# Patient Record
Sex: FEMALE | URBAN - METROPOLITAN AREA
[De-identification: names, ages, dates, MRNs, and addresses within clinical notes are randomized per-mention and may not be internally consistent; named-entity substitution may affect disease eponyms.]

---

## 2021-02-22 ENCOUNTER — OFFICE VISIT (OUTPATIENT)
Dept: URBAN - METROPOLITAN AREA CLINIC 92 | Facility: CLINIC | Age: 56
End: 2021-02-22

## 2021-10-25 ENCOUNTER — CLAIMS CREATED FROM THE CLAIM WINDOW (OUTPATIENT)
Dept: URBAN - METROPOLITAN AREA SURGERY CENTER 16 | Facility: SURGERY CENTER | Age: 56
End: 2021-10-25

## 2021-10-25 ENCOUNTER — CLAIMS CREATED FROM THE CLAIM WINDOW (OUTPATIENT)
Dept: URBAN - METROPOLITAN AREA CLINIC 4 | Facility: CLINIC | Age: 56
End: 2021-10-25
Payer: COMMERCIAL

## 2021-10-25 ENCOUNTER — CLAIMS CREATED FROM THE CLAIM WINDOW (OUTPATIENT)
Dept: URBAN - METROPOLITAN AREA SURGERY CENTER 16 | Facility: SURGERY CENTER | Age: 56
End: 2021-10-25
Payer: COMMERCIAL

## 2021-10-25 DIAGNOSIS — D12.8 ADENOMATOUS POLYP OF RECTUM: ICD-10-CM

## 2021-10-25 DIAGNOSIS — Z12.11 AVERAGE RISK FOR CRC. DUE TO PT'S CO-MORBID STATE WITH END STAGE DEMENTIA, HIGH RISK FOR ANESTHESIA (PER NEUROLOGY); INABILITY TO TAKE A BOWEL PREP....WOULD NOT ADVISE ANY COLORECTAL CANCER SCREENING INCLUDING STOOL TEST FOR FECAL BLOOD.: ICD-10-CM

## 2021-10-25 DIAGNOSIS — Z80.0 BROTHER AT YOUNG AGE FAMILY HISTORY OF COLON CANCER: ICD-10-CM

## 2021-10-25 DIAGNOSIS — D12.8 BENIGN NEOPLASM OF RECTUM: ICD-10-CM

## 2021-10-25 PROCEDURE — 45385 COLONOSCOPY W/LESION REMOVAL: CPT | Performed by: INTERNAL MEDICINE

## 2021-10-25 PROCEDURE — 88305 TISSUE EXAM BY PATHOLOGIST: CPT | Performed by: PATHOLOGY

## 2021-10-25 PROCEDURE — G8907 PT DOC NO EVENTS ON DISCHARG: HCPCS | Performed by: INTERNAL MEDICINE

## 2022-04-11 ENCOUNTER — OFFICE VISIT (OUTPATIENT)
Dept: URBAN - METROPOLITAN AREA CLINIC 92 | Facility: CLINIC | Age: 57
End: 2022-04-11

## 2023-01-09 ENCOUNTER — OFFICE VISIT (OUTPATIENT)
Dept: FAMILY MEDICINE CLINIC | Facility: CLINIC | Age: 58
End: 2023-01-09
Payer: COMMERCIAL

## 2023-01-09 VITALS
TEMPERATURE: 97.5 F | DIASTOLIC BLOOD PRESSURE: 94 MMHG | WEIGHT: 199.4 LBS | SYSTOLIC BLOOD PRESSURE: 150 MMHG | HEIGHT: 66 IN | HEART RATE: 78 BPM | RESPIRATION RATE: 16 BRPM | BODY MASS INDEX: 32.05 KG/M2

## 2023-01-09 DIAGNOSIS — I10 PRIMARY HYPERTENSION: Chronic | ICD-10-CM

## 2023-01-09 DIAGNOSIS — E78.5 HYPERLIPIDEMIA, UNSPECIFIED HYPERLIPIDEMIA TYPE: Chronic | ICD-10-CM

## 2023-01-09 DIAGNOSIS — Z00.00 ENCOUNTER FOR ANNUAL PHYSICAL EXAM: Primary | ICD-10-CM

## 2023-01-09 DIAGNOSIS — Z11.59 ENCOUNTER FOR HEPATITIS C SCREENING TEST FOR LOW RISK PATIENT: ICD-10-CM

## 2023-01-09 DIAGNOSIS — Z12.4 ENCOUNTER FOR PAPANICOLAOU SMEAR FOR CERVICAL CANCER SCREENING: ICD-10-CM

## 2023-01-09 DIAGNOSIS — K21.9 GASTROESOPHAGEAL REFLUX DISEASE, UNSPECIFIED WHETHER ESOPHAGITIS PRESENT: Chronic | ICD-10-CM

## 2023-01-09 DIAGNOSIS — A60.09 HERPES GENITALIS IN WOMEN: Chronic | ICD-10-CM

## 2023-01-09 DIAGNOSIS — E66.09 CLASS 1 OBESITY DUE TO EXCESS CALORIES WITHOUT SERIOUS COMORBIDITY WITH BODY MASS INDEX (BMI) OF 32.0 TO 32.9 IN ADULT: Chronic | ICD-10-CM

## 2023-01-09 PROCEDURE — 99386 PREV VISIT NEW AGE 40-64: CPT | Performed by: NURSE PRACTITIONER

## 2023-01-09 RX ORDER — HYDROCHLOROTHIAZIDE 12.5 MG/1
CAPSULE, GELATIN COATED ORAL
COMMUNITY
Start: 2023-01-02 | End: 2023-01-09 | Stop reason: SDUPTHER

## 2023-01-09 RX ORDER — PANTOPRAZOLE SODIUM 20 MG/1
20 TABLET, DELAYED RELEASE ORAL DAILY
COMMUNITY
End: 2023-01-09 | Stop reason: SDUPTHER

## 2023-01-09 RX ORDER — AMLODIPINE BESYLATE 10 MG/1
TABLET ORAL
COMMUNITY
Start: 2022-10-06 | End: 2023-01-09 | Stop reason: SDUPTHER

## 2023-01-09 RX ORDER — PANTOPRAZOLE SODIUM 20 MG/1
20 TABLET, DELAYED RELEASE ORAL DAILY
Qty: 90 TABLET | Refills: 0 | Status: SHIPPED | OUTPATIENT
Start: 2023-01-09 | End: 2023-04-07

## 2023-01-09 RX ORDER — AMLODIPINE BESYLATE 10 MG/1
10 TABLET ORAL DAILY
Qty: 30 TABLET | Refills: 0 | Status: SHIPPED | OUTPATIENT
Start: 2023-01-09 | End: 2023-02-01

## 2023-01-09 RX ORDER — ROSUVASTATIN CALCIUM 10 MG/1
TABLET, COATED ORAL
COMMUNITY
Start: 2022-10-06 | End: 2023-01-09 | Stop reason: SDUPTHER

## 2023-01-09 RX ORDER — VALACYCLOVIR HYDROCHLORIDE 1 G/1
1000 TABLET, FILM COATED ORAL 2 TIMES DAILY
Qty: 180 TABLET | Refills: 0 | Status: SHIPPED | OUTPATIENT
Start: 2023-01-09 | End: 2023-04-09

## 2023-01-09 RX ORDER — LOSARTAN POTASSIUM 50 MG/1
50 TABLET ORAL DAILY
Qty: 30 TABLET | Refills: 0 | Status: SHIPPED | OUTPATIENT
Start: 2023-01-09 | End: 2023-01-23

## 2023-01-09 RX ORDER — ROSUVASTATIN CALCIUM 10 MG/1
10 TABLET, COATED ORAL NIGHTLY
Qty: 90 TABLET | Refills: 0 | Status: SHIPPED | OUTPATIENT
Start: 2023-01-09 | End: 2023-04-07

## 2023-01-09 RX ORDER — HYDROCHLOROTHIAZIDE 12.5 MG/1
12.5 CAPSULE, GELATIN COATED ORAL EVERY MORNING
Qty: 30 CAPSULE | Refills: 0 | Status: SHIPPED | OUTPATIENT
Start: 2023-01-09

## 2023-01-09 RX ORDER — VALACYCLOVIR HYDROCHLORIDE 1 G/1
1000 TABLET, FILM COATED ORAL 2 TIMES DAILY
COMMUNITY
End: 2023-01-09 | Stop reason: SDUPTHER

## 2023-01-09 RX ORDER — LOSARTAN POTASSIUM 50 MG/1
TABLET ORAL
COMMUNITY
Start: 2022-10-06 | End: 2023-01-09 | Stop reason: SDUPTHER

## 2023-01-09 NOTE — PROGRESS NOTES
Subjective     Chief Complaint   Patient presents with   • Establish Care     Needs annual physical      History of Present Illness     Patient presents today to establish care.  She is needing her annual physical and refill on several medications.     Is concerned about elevated blood pressure.  She can tell when her blood pressure is elevated because she is experiencing more frequent headaches.  She does not check her blood pressure regularly, only when she has headache or does not feel well.      Patient's PMR from outside medical facility reviewed and noted.    Review of Systems   Neurological: Positive for headaches.   Psychiatric/Behavioral: The patient is nervous/anxious.         Otherwise complete ROS reviewed and negative except as mentioned in the HPI.    Past Medical History:   Past Medical History:   Diagnosis Date   • GERD (gastroesophageal reflux disease)    • Hyperlipidemia    • Hypertension      Past Surgical History:History reviewed. No pertinent surgical history.  Social History:  reports that she quit smoking about 9 months ago. Her smoking use included cigarettes. She has a 5.00 pack-year smoking history. She has never used smokeless tobacco. She reports current alcohol use. She reports that she does not use drugs.    Family History: family history includes Colon cancer in her father; Lung cancer in her mother.      Allergies:  No Known Allergies  Medications:  Prior to Admission medications    Medication Sig Start Date End Date Taking? Authorizing Provider   amLODIPine (NORVASC) 10 MG tablet  10/6/22  Yes ProviderShakir MD   hydroCHLOROthiazide (MICROZIDE) 12.5 MG capsule  1/2/23  Yes Provider, MD Shakir   losartan (COZAAR) 50 MG tablet  10/6/22  Yes Provider, MD Shakir   pantoprazole (PROTONIX) 20 MG EC tablet Take 20 mg by mouth Daily.   Yes ProviderShakir MD   rosuvastatin (CRESTOR) 10 MG tablet  10/6/22  Yes Provider, MD Shakir   valACYclovir (Valtrex)  "1000 MG tablet Take 1,000 mg by mouth 2 (Two) Times a Day.   Yes Provider, MD Shakir         PHQ-9 Depression Screening  Little interest or pleasure in doing things? 0-->not at all   Feeling down, depressed, or hopeless? 0-->not at all   Trouble falling or staying asleep, or sleeping too much?     Feeling tired or having little energy?     Poor appetite or overeating?     Feeling bad about yourself - or that you are a failure or have let yourself or your family down?     Trouble concentrating on things, such as reading the newspaper or watching television?     Moving or speaking so slowly that other people could have noticed? Or the opposite - being so fidgety or restless that you have been moving around a lot more than usual?     Thoughts that you would be better off dead, or of hurting yourself in some way?     PHQ-9 Total Score 0   If you checked off any problems, how difficult have these problems made it for you to do your work, take care of things at home, or get along with other people?         PHQ-9 Total Score: 0   0 (Negative screening for depression)      Objective     Vital Signs: /94 (BP Location: Right arm, Patient Position: Sitting, Cuff Size: Adult)   Pulse 78   Temp 97.5 °F (36.4 °C) (Infrared)   Resp 16   Ht 166.4 cm (65.5\")   Wt 90.4 kg (199 lb 6.4 oz)   BMI 32.68 kg/m²   Physical Exam  Vitals and nursing note reviewed.   Constitutional:       Appearance: Normal appearance. She is obese.   HENT:      Head: Normocephalic.      Right Ear: Tympanic membrane normal.      Left Ear: Tympanic membrane normal.      Nose: Nose normal.      Mouth/Throat:      Mouth: Mucous membranes are moist.   Eyes:      Extraocular Movements: Extraocular movements intact.      Pupils: Pupils are equal, round, and reactive to light.   Cardiovascular:      Rate and Rhythm: Normal rate and regular rhythm.      Pulses: Normal pulses.      Heart sounds: Normal heart sounds.   Pulmonary:      Effort: Pulmonary " effort is normal.      Breath sounds: Normal breath sounds.   Abdominal:      General: Bowel sounds are normal.      Palpations: Abdomen is soft.   Musculoskeletal:         General: Normal range of motion.      Cervical back: Normal range of motion.   Lymphadenopathy:      Cervical: No cervical adenopathy.   Skin:     General: Skin is warm and dry.   Neurological:      General: No focal deficit present.      Mental Status: She is alert and oriented to person, place, and time.   Psychiatric:         Mood and Affect: Mood normal.         Behavior: Behavior normal.         Thought Content: Thought content normal.         Judgment: Judgment normal.         BMI is >= 30 and <35. (Class 1 Obesity). The following options were offered after discussion;: exercise counseling/recommendations and nutrition counseling/recommendations           Results Reviewed:  Glucose   Date Value Ref Range Status   01/09/2023 118 (H) 70 - 99 mg/dL Final     BUN   Date Value Ref Range Status   01/09/2023 16 6 - 24 mg/dL Final     Creatinine   Date Value Ref Range Status   01/09/2023 0.78 0.57 - 1.00 mg/dL Final     Sodium   Date Value Ref Range Status   01/09/2023 140 134 - 144 mmol/L Final     Potassium   Date Value Ref Range Status   01/09/2023 4.5 3.5 - 5.2 mmol/L Final     Chloride   Date Value Ref Range Status   01/09/2023 104 96 - 106 mmol/L Final     Total CO2   Date Value Ref Range Status   01/09/2023 21 20 - 29 mmol/L Final     Calcium   Date Value Ref Range Status   01/09/2023 10.1 8.7 - 10.2 mg/dL Final     ALT (SGPT)   Date Value Ref Range Status   01/09/2023 25 0 - 32 IU/L Final     AST (SGOT)   Date Value Ref Range Status   01/09/2023 22 0 - 40 IU/L Final     WBC   Date Value Ref Range Status   01/09/2023 6.2 3.4 - 10.8 x10E3/uL Final     Hematocrit   Date Value Ref Range Status   01/09/2023 38.0 34.0 - 46.6 % Final     Platelets   Date Value Ref Range Status   01/09/2023 213 150 - 450 x10E3/uL Final     Triglycerides   Date  Value Ref Range Status   01/09/2023 134 0 - 149 mg/dL Final     HDL Cholesterol   Date Value Ref Range Status   01/09/2023 67 >39 mg/dL Final     LDL Chol Calc (NIH)   Date Value Ref Range Status   01/09/2023 88 0 - 99 mg/dL Final     Hemoglobin A1C   Date Value Ref Range Status   01/09/2023 6.0 (H) 4.8 - 5.6 % Final     Comment:              Prediabetes: 5.7 - 6.4           Diabetes: >6.4           Glycemic control for adults with diabetes: <7.0           Assessment / Plan     Assessment/Plan     Diagnoses and all orders for this visit:    1. Encounter for annual physical exam (Primary)  -     CBC & Differential  -     Comprehensive Metabolic Panel  -     TSH+Free T4  -     Lipid Panel  -     Hemoglobin A1c    2. Class 1 obesity due to excess calories without serious comorbidity with body mass index (BMI) of 32.0 to 32.9 in adult  Assessment & Plan:  Patient's (Body mass index is 32.68 kg/m².) indicates that they are obese (BMI >30) with health conditions that include hypertension, dyslipidemias and GERD . Weight is newly identified. BMI is is above average; BMI management plan is completed. We discussed low calorie, low carb based diet program, portion control and increasing exercise.     Orders:  -     CBC & Differential  -     Comprehensive Metabolic Panel  -     TSH+Free T4  -     Lipid Panel  -     Hemoglobin A1c    3. Primary hypertension  Assessment & Plan:  Blood pressure is elevated today.  Patient states blood pressure has recently been elevated at home.  Blood pressure log for 1 week.  Refills on HCTZ, amlodipine, and losartan.  Follow-up in 2 weeks on hypertension.      Orders:  -     amLODIPine (NORVASC) 10 MG tablet; Take 1 tablet by mouth Daily.  Dispense: 30 tablet; Refill: 0  -     hydroCHLOROthiazide (MICROZIDE) 12.5 MG capsule; Take 1 capsule by mouth Every Morning.  Dispense: 30 capsule; Refill: 0  -     losartan (COZAAR) 50 MG tablet; Take 1 tablet by mouth Daily.  Dispense: 30 tablet; Refill:  0    4. Gastroesophageal reflux disease, unspecified whether esophagitis present  -     pantoprazole (PROTONIX) 20 MG EC tablet; Take 1 tablet by mouth Daily for 90 days.  Dispense: 90 tablet; Refill: 0    5. Hyperlipidemia, unspecified hyperlipidemia type  -     rosuvastatin (CRESTOR) 10 MG tablet; Take 1 tablet by mouth Every Night for 90 days.  Dispense: 90 tablet; Refill: 0    6. Herpes genitalis in women  -     valACYclovir (Valtrex) 1000 MG tablet; Take 1 tablet by mouth 2 (Two) Times a Day for 90 days.  Dispense: 180 tablet; Refill: 0    7. Encounter for Papanicolaou smear for cervical cancer screening  -     Ambulatory Referral to Gynecology    8. Encounter for hepatitis C screening test for low risk patient  -     Hepatitis C Antibody           An After Visit Summary was printed and given to the patient at discharge.  Return in about 2 weeks (around 1/23/2023) for Recheck hypertension.    I have discussed the patient results/orders and plan/recommendation with them at today's visit.      Rylee Lopez, CHELSEA   01/09/2023

## 2023-01-09 NOTE — PROGRESS NOTES
Venipuncture Blood Specimen Collection  Venipuncture performed in [facility] by Juliana Bowen LPN with good hemostasis. Patient tolerated the procedure well without complications.   01/09/23   Juliana Bowen LPN

## 2023-01-10 LAB
ALBUMIN SERPL-MCNC: 4.7 G/DL (ref 3.8–4.9)
ALBUMIN/GLOB SERPL: 2 {RATIO} (ref 1.2–2.2)
ALP SERPL-CCNC: 76 IU/L (ref 44–121)
ALT SERPL-CCNC: 25 IU/L (ref 0–32)
AST SERPL-CCNC: 22 IU/L (ref 0–40)
BASOPHILS # BLD AUTO: 0 X10E3/UL (ref 0–0.2)
BASOPHILS NFR BLD AUTO: 1 %
BILIRUB SERPL-MCNC: 0.3 MG/DL (ref 0–1.2)
BUN SERPL-MCNC: 16 MG/DL (ref 6–24)
BUN/CREAT SERPL: 21 (ref 9–23)
CALCIUM SERPL-MCNC: 10.1 MG/DL (ref 8.7–10.2)
CHLORIDE SERPL-SCNC: 104 MMOL/L (ref 96–106)
CHOLEST SERPL-MCNC: 178 MG/DL (ref 100–199)
CO2 SERPL-SCNC: 21 MMOL/L (ref 20–29)
CREAT SERPL-MCNC: 0.78 MG/DL (ref 0.57–1)
EGFRCR SERPLBLD CKD-EPI 2021: 90 ML/MIN/1.73
EOSINOPHIL # BLD AUTO: 0.1 X10E3/UL (ref 0–0.4)
EOSINOPHIL NFR BLD AUTO: 2 %
ERYTHROCYTE [DISTWIDTH] IN BLOOD BY AUTOMATED COUNT: 12.6 % (ref 11.7–15.4)
GLOBULIN SER CALC-MCNC: 2.3 G/DL (ref 1.5–4.5)
GLUCOSE SERPL-MCNC: 118 MG/DL (ref 70–99)
HBA1C MFR BLD: 6 % (ref 4.8–5.6)
HCT VFR BLD AUTO: 38 % (ref 34–46.6)
HCV AB S/CO SERPL IA: <0.1 S/CO RATIO (ref 0–0.9)
HDLC SERPL-MCNC: 67 MG/DL
HGB BLD-MCNC: 13 G/DL (ref 11.1–15.9)
IMM GRANULOCYTES # BLD AUTO: 0 X10E3/UL (ref 0–0.1)
IMM GRANULOCYTES NFR BLD AUTO: 0 %
LDLC SERPL CALC-MCNC: 88 MG/DL (ref 0–99)
LYMPHOCYTES # BLD AUTO: 1.7 X10E3/UL (ref 0.7–3.1)
LYMPHOCYTES NFR BLD AUTO: 27 %
MCH RBC QN AUTO: 34.9 PG (ref 26.6–33)
MCHC RBC AUTO-ENTMCNC: 34.2 G/DL (ref 31.5–35.7)
MCV RBC AUTO: 102 FL (ref 79–97)
MONOCYTES # BLD AUTO: 0.4 X10E3/UL (ref 0.1–0.9)
MONOCYTES NFR BLD AUTO: 7 %
NEUTROPHILS # BLD AUTO: 4 X10E3/UL (ref 1.4–7)
NEUTROPHILS NFR BLD AUTO: 63 %
PLATELET # BLD AUTO: 213 X10E3/UL (ref 150–450)
POTASSIUM SERPL-SCNC: 4.5 MMOL/L (ref 3.5–5.2)
PROT SERPL-MCNC: 7 G/DL (ref 6–8.5)
RBC # BLD AUTO: 3.72 X10E6/UL (ref 3.77–5.28)
SODIUM SERPL-SCNC: 140 MMOL/L (ref 134–144)
T4 FREE SERPL-MCNC: 1.12 NG/DL (ref 0.82–1.77)
TRIGL SERPL-MCNC: 134 MG/DL (ref 0–149)
TSH SERPL DL<=0.005 MIU/L-ACNC: 1.01 UIU/ML (ref 0.45–4.5)
VLDLC SERPL CALC-MCNC: 23 MG/DL (ref 5–40)
WBC # BLD AUTO: 6.2 X10E3/UL (ref 3.4–10.8)

## 2023-01-13 PROBLEM — E66.811 CLASS 1 OBESITY DUE TO EXCESS CALORIES WITHOUT SERIOUS COMORBIDITY WITH BODY MASS INDEX (BMI) OF 32.0 TO 32.9 IN ADULT: Chronic | Status: ACTIVE | Noted: 2023-01-13

## 2023-01-13 PROBLEM — A60.09 HERPES GENITALIS IN WOMEN: Chronic | Status: ACTIVE | Noted: 2023-01-13

## 2023-01-13 PROBLEM — E66.09 CLASS 1 OBESITY DUE TO EXCESS CALORIES WITHOUT SERIOUS COMORBIDITY WITH BODY MASS INDEX (BMI) OF 32.0 TO 32.9 IN ADULT: Chronic | Status: ACTIVE | Noted: 2023-01-13

## 2023-01-13 PROBLEM — E78.5 HYPERLIPIDEMIA: Chronic | Status: ACTIVE | Noted: 2023-01-13

## 2023-01-13 PROBLEM — K21.9 GASTROESOPHAGEAL REFLUX DISEASE: Chronic | Status: ACTIVE | Noted: 2023-01-13

## 2023-01-13 PROBLEM — I10 PRIMARY HYPERTENSION: Chronic | Status: ACTIVE | Noted: 2023-01-13

## 2023-01-13 NOTE — ASSESSMENT & PLAN NOTE
Blood pressure is elevated today.  Patient states blood pressure has recently been elevated at home.  Blood pressure log for 1 week.  Refills on HCTZ, amlodipine, and losartan.  Follow-up in 2 weeks on hypertension.

## 2023-01-13 NOTE — ASSESSMENT & PLAN NOTE
Patient's (Body mass index is 32.68 kg/m².) indicates that they are obese (BMI >30) with health conditions that include hypertension, dyslipidemias and GERD . Weight is newly identified. BMI is is above average; BMI management plan is completed. We discussed low calorie, low carb based diet program, portion control and increasing exercise.

## 2023-01-23 ENCOUNTER — OFFICE VISIT (OUTPATIENT)
Dept: FAMILY MEDICINE CLINIC | Facility: CLINIC | Age: 58
End: 2023-01-23
Payer: COMMERCIAL

## 2023-01-23 VITALS
DIASTOLIC BLOOD PRESSURE: 85 MMHG | BODY MASS INDEX: 32.82 KG/M2 | RESPIRATION RATE: 18 BRPM | OXYGEN SATURATION: 99 % | WEIGHT: 204.2 LBS | TEMPERATURE: 97.8 F | SYSTOLIC BLOOD PRESSURE: 147 MMHG | HEIGHT: 66 IN | HEART RATE: 73 BPM

## 2023-01-23 DIAGNOSIS — Z12.31 ENCOUNTER FOR SCREENING MAMMOGRAM FOR MALIGNANT NEOPLASM OF BREAST: ICD-10-CM

## 2023-01-23 DIAGNOSIS — R53.83 FATIGUE, UNSPECIFIED TYPE: ICD-10-CM

## 2023-01-23 DIAGNOSIS — Z12.2 ENCOUNTER FOR SCREENING FOR MALIGNANT NEOPLASM OF LUNG: ICD-10-CM

## 2023-01-23 DIAGNOSIS — I10 PRIMARY HYPERTENSION: Primary | Chronic | ICD-10-CM

## 2023-01-23 DIAGNOSIS — D53.9 MACROCYTIC ANEMIA: ICD-10-CM

## 2023-01-23 PROCEDURE — 99214 OFFICE O/P EST MOD 30 MIN: CPT | Performed by: NURSE PRACTITIONER

## 2023-01-23 RX ORDER — LOSARTAN POTASSIUM 100 MG/1
100 TABLET ORAL DAILY
Qty: 90 TABLET | Refills: 0 | Status: SHIPPED | OUTPATIENT
Start: 2023-01-23 | End: 2023-02-03 | Stop reason: SDUPTHER

## 2023-01-24 LAB
25(OH)D3+25(OH)D2 SERPL-MCNC: 31.8 NG/ML (ref 30–100)
FOLATE SERPL-MCNC: 6.2 NG/ML
VIT B12 SERPL-MCNC: 551 PG/ML (ref 232–1245)

## 2023-02-01 DIAGNOSIS — I10 PRIMARY HYPERTENSION: Chronic | ICD-10-CM

## 2023-02-01 RX ORDER — AMLODIPINE BESYLATE 10 MG/1
TABLET ORAL
Qty: 30 TABLET | Refills: 0 | Status: SHIPPED | OUTPATIENT
Start: 2023-02-01 | End: 2023-02-03 | Stop reason: SDUPTHER

## 2023-02-01 RX ORDER — LOSARTAN POTASSIUM 50 MG/1
TABLET ORAL
Qty: 30 TABLET | Refills: 0 | OUTPATIENT
Start: 2023-02-01

## 2023-02-02 ENCOUNTER — TELEPHONE (OUTPATIENT)
Dept: FAMILY MEDICINE CLINIC | Facility: CLINIC | Age: 58
End: 2023-02-02
Payer: COMMERCIAL

## 2023-02-02 NOTE — TELEPHONE ENCOUNTER
Spoke to PT regarding CT smokers screening and let her know ins would not cover. PT asked me to cancel scan. I cancelled for her. MICHEAL

## 2023-02-03 DIAGNOSIS — I10 PRIMARY HYPERTENSION: Chronic | ICD-10-CM

## 2023-02-03 RX ORDER — AMLODIPINE BESYLATE 10 MG/1
10 TABLET ORAL DAILY
Qty: 90 TABLET | Refills: 0 | Status: SHIPPED | OUTPATIENT
Start: 2023-02-03 | End: 2023-05-04

## 2023-02-03 RX ORDER — LOSARTAN POTASSIUM 100 MG/1
100 TABLET ORAL DAILY
Qty: 90 TABLET | Refills: 0 | Status: SHIPPED | OUTPATIENT
Start: 2023-02-03 | End: 2023-05-04

## 2023-02-07 ENCOUNTER — HOSPITAL ENCOUNTER (OUTPATIENT)
Dept: MAMMOGRAPHY | Facility: HOSPITAL | Age: 58
Discharge: HOME | End: 2023-02-07
Admitting: NURSE PRACTITIONER
Payer: COMMERCIAL

## 2023-02-07 ENCOUNTER — HOSPITAL ENCOUNTER (OUTPATIENT)
Dept: CT IMAGING | Facility: HOSPITAL | Age: 58
End: 2023-02-07
Payer: COMMERCIAL

## 2023-02-07 DIAGNOSIS — Z12.31 ENCOUNTER FOR SCREENING MAMMOGRAM FOR MALIGNANT NEOPLASM OF BREAST: ICD-10-CM

## 2023-02-07 PROCEDURE — 77063 BREAST TOMOSYNTHESIS BI: CPT

## 2023-02-07 PROCEDURE — 77067 SCR MAMMO BI INCL CAD: CPT

## 2023-03-22 ENCOUNTER — OFFICE VISIT (OUTPATIENT)
Dept: OBSTETRICS AND GYNECOLOGY | Facility: CLINIC | Age: 58
End: 2023-03-22
Payer: COMMERCIAL

## 2023-03-22 VITALS
BODY MASS INDEX: 33.27 KG/M2 | DIASTOLIC BLOOD PRESSURE: 88 MMHG | HEIGHT: 66 IN | SYSTOLIC BLOOD PRESSURE: 148 MMHG | WEIGHT: 207 LBS

## 2023-03-22 DIAGNOSIS — Z01.419 WELL WOMAN EXAM WITH ROUTINE GYNECOLOGICAL EXAM: Primary | ICD-10-CM

## 2023-03-22 DIAGNOSIS — Z78.9 NON-SMOKER: ICD-10-CM

## 2023-03-22 DIAGNOSIS — Z12.4 ENCOUNTER FOR SCREENING FOR CERVICAL CANCER: ICD-10-CM

## 2023-03-22 PROCEDURE — 87624 HPV HI-RISK TYP POOLED RSLT: CPT | Performed by: OBSTETRICS & GYNECOLOGY

## 2023-03-22 PROCEDURE — G0123 SCREEN CERV/VAG THIN LAYER: HCPCS | Performed by: OBSTETRICS & GYNECOLOGY

## 2023-03-22 NOTE — PROGRESS NOTES
"Chief Complaint  Gynecologic Exam (Pt here for annual and denies any problems, last pap 2021 per pt report that was normal in [place] at [facility], last mammogram 02/07/2023 normal, last colonoscopy 8/2022 in [place])    Subjective        Olivia Solares presents to [facility]  History of Present Illness  Patient presents today for yearly exam  She is without gynecologic complaint  Self breast exam, diet, exercise, multivitamin use, seatbelt use all discussed      Objective   Vital Signs:  /88 (BP Location: Right arm, Patient Position: Sitting, Cuff Size: Large Adult)   Ht 166.4 cm (65.51\")   Wt 93.9 kg (207 lb)   BMI 33.91 kg/m²   Estimated body mass index is 33.91 kg/m² as calculated from the following:    Height as of this encounter: 166.4 cm (65.51\").    Weight as of this encounter: 93.9 kg (207 lb).             Physical Exam  Vitals and nursing note reviewed. Exam conducted with a chaperone present.   Constitutional:       Appearance: Normal appearance.   HENT:      Head: Normocephalic and atraumatic.      Mouth/Throat:      Mouth: Mucous membranes are moist.   Eyes:      Extraocular Movements: Extraocular movements intact.      Pupils: Pupils are equal, round, and reactive to light.   Neck:      Vascular: No carotid bruit.   Cardiovascular:      Rate and Rhythm: Normal rate and regular rhythm.      Pulses: Normal pulses.      Heart sounds: Normal heart sounds. No murmur heard.    No friction rub. No gallop.   Pulmonary:      Effort: Pulmonary effort is normal. No respiratory distress.      Breath sounds: Normal breath sounds. No stridor. No wheezing, rhonchi or rales.   Chest:      Chest wall: No tenderness.   Breasts:     Breasts are symmetrical.      Right: Normal. No swelling, bleeding, inverted nipple, mass, nipple discharge, skin change or tenderness.      Left: Normal. No swelling, bleeding, inverted nipple, mass, nipple discharge, skin change or tenderness. "   Abdominal:      General: Abdomen is flat. Bowel sounds are normal. There is no distension.      Palpations: Abdomen is soft. There is no mass.      Tenderness: There is no abdominal tenderness. There is no right CVA tenderness, left CVA tenderness, guarding or rebound.      Hernia: No hernia is present. There is no hernia in the left inguinal area or right inguinal area.   Genitourinary:     General: Normal vulva.      Exam position: Lithotomy position.      Pubic Area: No rash or pubic lice.       Labia:         Right: No rash, tenderness, lesion or injury.         Left: No rash, tenderness, lesion or injury.       Urethra: No prolapse, urethral pain, urethral swelling or urethral lesion.      Vagina: Normal.      Cervix: Normal.      Uterus: Normal. Not deviated, not enlarged, not fixed, not tender and no uterine prolapse.       Adnexa: Right adnexa normal and left adnexa normal.        Right: No mass, tenderness or fullness.          Left: No mass, tenderness or fullness.     Musculoskeletal:         General: Normal range of motion.      Cervical back: Normal range of motion and neck supple. No rigidity. No muscular tenderness.   Lymphadenopathy:      Cervical: No cervical adenopathy.      Upper Body:      Right upper body: No supraclavicular, axillary or pectoral adenopathy.      Left upper body: No supraclavicular, axillary or pectoral adenopathy.      Lower Body: No right inguinal adenopathy. No left inguinal adenopathy.   Skin:     General: Skin is warm and dry.   Neurological:      General: No focal deficit present.      Mental Status: She is alert and oriented to person, place, and time. Mental status is at baseline.   Psychiatric:         Mood and Affect: Mood normal.         Behavior: Behavior normal.         Thought Content: Thought content normal.         Judgment: Judgment normal.        Result Review :                   Assessment and Plan   Diagnoses and all orders for this visit:    1. Well woman  exam with routine gynecological exam (Primary)    2. Encounter for screening for cervical cancer  -     Liquid-based Pap Smear, Screening    3. Non-smoker             Follow Up   Return in about 1 year (around 3/22/2024) for Annual physical, with me.  Patient was given instructions and counseling regarding her condition or for health maintenance advice. Please see specific information pulled into the AVS if appropriate.   Will call patient with Pap smear results  Call for concerns or questions    Pablo Allison MD

## 2023-03-24 LAB
GEN CATEG CVX/VAG CYTO-IMP: NORMAL
LAB AP CASE REPORT: NORMAL
LAB AP GYN ADDITIONAL INFORMATION: NORMAL
LAB AP GYN OTHER FINDINGS: NORMAL
Lab: NORMAL
PATH INTERP SPEC-IMP: NORMAL
STAT OF ADQ CVX/VAG CYTO-IMP: NORMAL

## 2023-04-03 LAB — HPV I/H RISK 4 DNA CVX QL PROBE+SIG AMP: NOT DETECTED

## 2023-04-07 DIAGNOSIS — E78.5 HYPERLIPIDEMIA, UNSPECIFIED HYPERLIPIDEMIA TYPE: Chronic | ICD-10-CM

## 2023-04-07 DIAGNOSIS — K21.9 GASTROESOPHAGEAL REFLUX DISEASE, UNSPECIFIED WHETHER ESOPHAGITIS PRESENT: Chronic | ICD-10-CM

## 2023-04-07 RX ORDER — ROSUVASTATIN CALCIUM 10 MG/1
10 TABLET, COATED ORAL NIGHTLY
Qty: 90 TABLET | Refills: 2 | Status: SHIPPED | OUTPATIENT
Start: 2023-04-07

## 2023-04-07 RX ORDER — PANTOPRAZOLE SODIUM 20 MG/1
TABLET, DELAYED RELEASE ORAL
Qty: 90 TABLET | Refills: 2 | Status: SHIPPED | OUTPATIENT
Start: 2023-04-07

## 2023-04-07 NOTE — TELEPHONE ENCOUNTER
Pending Prescriptions:                       Disp   Refills    rosuvastatin (CRESTOR) 10 MG tablet [Pharm*90 tab*0        Sig: Take 1 tablet by mouth Every Night for 90 days.    pantoprazole (PROTONIX) 20 MG EC tablet [P*90 tab*0        Sig: TAKE 1 TABLET BY MOUTH EVERY DAY

## 2023-04-10 ENCOUNTER — OFFICE VISIT (OUTPATIENT)
Dept: FAMILY MEDICINE CLINIC | Facility: CLINIC | Age: 58
End: 2023-04-10
Payer: COMMERCIAL

## 2023-04-10 VITALS
OXYGEN SATURATION: 98 % | DIASTOLIC BLOOD PRESSURE: 78 MMHG | BODY MASS INDEX: 33.91 KG/M2 | RESPIRATION RATE: 16 BRPM | WEIGHT: 211 LBS | HEIGHT: 66 IN | HEART RATE: 73 BPM | TEMPERATURE: 98 F | SYSTOLIC BLOOD PRESSURE: 127 MMHG

## 2023-04-10 DIAGNOSIS — M79.672 LEFT FOOT PAIN: Primary | ICD-10-CM

## 2023-04-10 DIAGNOSIS — M25.571 ACUTE RIGHT ANKLE PAIN: ICD-10-CM

## 2023-04-10 DIAGNOSIS — M25.572 ACUTE LEFT ANKLE PAIN: ICD-10-CM

## 2023-04-10 DIAGNOSIS — M79.671 RIGHT FOOT PAIN: ICD-10-CM

## 2023-04-10 NOTE — PROGRESS NOTES
Subjective     Chief Complaint   Patient presents with   • Leg Pain     Bilateral. Right is worse. Knee to ankle. 8/10 pain. X several months off and on.        History of Present Illness    Patient presents today with bilateral leg and ankle pain for several months off and on.  Pain is from knees to ankle, 8/10.  Better with siting, worse with walking initially but then improves.  Right ankle worse.  History of right achilles tendon repair.  Medial right foot, lateral left foot.  Pins and needles.  Tried ankle braces/wraps but makes it worse.  Some improvement with Biofreeze and ibuprofen.     Patient's PMR from outside medical facility reviewed and noted.    Review of Systems   Musculoskeletal: Positive for gait problem and myalgias. Negative for joint swelling.        Otherwise complete ROS reviewed and negative except as mentioned in the HPI.    Past Medical History:   Past Medical History:   Diagnosis Date   • GERD (gastroesophageal reflux disease)    • Hyperlipidemia    • Hypertension      Past Surgical History:History reviewed. No pertinent surgical history.  Social History:  reports that she quit smoking about a year ago. Her smoking use included cigarettes. She has a 5.00 pack-year smoking history. She has never used smokeless tobacco. She reports current alcohol use. She reports that she does not use drugs.    Family History: family history includes Breast cancer in her half-sister; Colon cancer in her father; Lung cancer in her mother.      Allergies:  No Known Allergies  Medications:  Prior to Admission medications    Medication Sig Start Date End Date Taking? Authorizing Provider   amLODIPine (NORVASC) 10 MG tablet Take 1 tablet by mouth Daily for 90 days. 2/3/23 5/4/23 Yes Rylee Lopez APRN   hydroCHLOROthiazide (MICROZIDE) 12.5 MG capsule Take 1 capsule by mouth Every Morning. 1/9/23  Yes Rylee Lopez APRN   losartan (Cozaar) 100 MG tablet Take 1 tablet by mouth Daily  "for 90 days. 2/3/23 5/4/23 Yes Rylee Lopez APRN   pantoprazole (PROTONIX) 20 MG EC tablet TAKE 1 TABLET BY MOUTH EVERY DAY 4/7/23  Yes Rylee Lopez APRN   rosuvastatin (CRESTOR) 10 MG tablet Take 1 tablet by mouth Every Night. 4/7/23  Yes Rylee Lopez APRN   valACYclovir (Valtrex) 1000 MG tablet Take 1 tablet by mouth 2 (Two) Times a Day for 90 days. 1/9/23 4/9/23  Rylee Lopez APRN         Objective     Vital Signs: /78 (BP Location: Right arm, Patient Position: Sitting, Cuff Size: Adult)   Pulse 73   Temp 98 °F (36.7 °C) (Infrared)   Resp 16   Ht 167.6 cm (66\")   Wt 95.7 kg (211 lb)   LMP  (LMP Unknown)   SpO2 98%   BMI 34.06 kg/m²   Physical Exam  Vitals and nursing note reviewed.   Constitutional:       Appearance: Normal appearance.   HENT:      Head: Normocephalic.      Nose: Nose normal.      Mouth/Throat:      Mouth: Mucous membranes are moist.   Eyes:      Extraocular Movements: Extraocular movements intact.      Pupils: Pupils are equal, round, and reactive to light.   Cardiovascular:      Pulses: Normal pulses.   Pulmonary:      Effort: Pulmonary effort is normal.   Musculoskeletal:         General: Normal range of motion.      Cervical back: Normal range of motion.      Right lower leg: No tenderness. No edema.      Left lower leg: No tenderness. No edema.      Right ankle: Tenderness present over the medial malleolus. Normal range of motion.      Right Achilles Tendon: No tenderness.      Left ankle: Tenderness present over the lateral malleolus. Normal range of motion.      Left Achilles Tendon: No tenderness.      Right foot: Normal range of motion. Tenderness present.      Left foot: Normal range of motion. Tenderness present.   Skin:     General: Skin is warm and dry.   Neurological:      General: No focal deficit present.      Mental Status: She is alert and oriented to person, place, and time.   Psychiatric:         Mood and " Affect: Mood normal.         Behavior: Behavior normal.         Thought Content: Thought content normal.         Judgment: Judgment normal.         BMI is >= 30 and <35. (Class 1 Obesity). The following options were offered after discussion;: exercise counseling/recommendations        Results Reviewed:  Glucose   Date Value Ref Range Status   01/09/2023 118 (H) 70 - 99 mg/dL Final     BUN   Date Value Ref Range Status   01/09/2023 16 6 - 24 mg/dL Final     Creatinine   Date Value Ref Range Status   01/09/2023 0.78 0.57 - 1.00 mg/dL Final     Sodium   Date Value Ref Range Status   01/09/2023 140 134 - 144 mmol/L Final     Potassium   Date Value Ref Range Status   01/09/2023 4.5 3.5 - 5.2 mmol/L Final     Chloride   Date Value Ref Range Status   01/09/2023 104 96 - 106 mmol/L Final     Total CO2   Date Value Ref Range Status   01/09/2023 21 20 - 29 mmol/L Final     Calcium   Date Value Ref Range Status   01/09/2023 10.1 8.7 - 10.2 mg/dL Final     ALT (SGPT)   Date Value Ref Range Status   01/09/2023 25 0 - 32 IU/L Final     AST (SGOT)   Date Value Ref Range Status   01/09/2023 22 0 - 40 IU/L Final     WBC   Date Value Ref Range Status   01/09/2023 6.2 3.4 - 10.8 x10E3/uL Final     Hematocrit   Date Value Ref Range Status   01/09/2023 38.0 34.0 - 46.6 % Final     Platelets   Date Value Ref Range Status   01/09/2023 213 150 - 450 x10E3/uL Final     Triglycerides   Date Value Ref Range Status   01/09/2023 134 0 - 149 mg/dL Final     HDL Cholesterol   Date Value Ref Range Status   01/09/2023 67 >39 mg/dL Final     LDL Chol Calc (NIH)   Date Value Ref Range Status   01/09/2023 88 0 - 99 mg/dL Final     Hemoglobin A1C   Date Value Ref Range Status   01/09/2023 6.0 (H) 4.8 - 5.6 % Final     Comment:              Prediabetes: 5.7 - 6.4           Diabetes: >6.4           Glycemic control for adults with diabetes: <7.0           Assessment / Plan     Assessment/Plan     Diagnoses and all orders for this visit:    1. Left foot  pain (Primary)  -     diclofenac (VOLTAREN) 50 MG EC tablet; Take 1 tablet by mouth 2 (Two) Times a Day.  Dispense: 60 tablet; Refill: 1    2. Acute right ankle pain  -     XR Ankle 2 View Right; Future  -     diclofenac (VOLTAREN) 50 MG EC tablet; Take 1 tablet by mouth 2 (Two) Times a Day.  Dispense: 60 tablet; Refill: 1    3. Acute left ankle pain  -     XR Ankle 3+ View Left; Future  -     diclofenac (VOLTAREN) 50 MG EC tablet; Take 1 tablet by mouth 2 (Two) Times a Day.  Dispense: 60 tablet; Refill: 1    4. Right foot pain  -     XR Foot 3+ View Right; Future  -     diclofenac (VOLTAREN) 50 MG EC tablet; Take 1 tablet by mouth 2 (Two) Times a Day.  Dispense: 60 tablet; Refill: 1         An After Visit Summary was printed and given to the patient at discharge.  Return if symptoms worsen or fail to improve.    I have discussed the patient results/orders and plan/recommendation with them at today's visit.      Rylee Lopez, CHELSEA   04/10/2023

## 2023-04-11 DIAGNOSIS — M25.572 ACUTE LEFT ANKLE PAIN: ICD-10-CM

## 2023-04-11 DIAGNOSIS — M79.671 RIGHT FOOT PAIN: Primary | ICD-10-CM

## 2023-04-11 DIAGNOSIS — M77.31 CALCANEAL SPUR OF FOOT, RIGHT: ICD-10-CM

## 2023-04-11 DIAGNOSIS — M79.671 RIGHT FOOT PAIN: ICD-10-CM

## 2023-04-11 DIAGNOSIS — M25.571 ACUTE RIGHT ANKLE PAIN: ICD-10-CM

## 2023-04-12 ENCOUNTER — TELEPHONE (OUTPATIENT)
Dept: FAMILY MEDICINE CLINIC | Facility: CLINIC | Age: 58
End: 2023-04-12
Payer: COMMERCIAL

## 2023-04-12 NOTE — TELEPHONE ENCOUNTER
PATIENT CALLED IN STATING SHE WOULD PREFER TO HAVE THE ORTHOPEDIC REFERRAL SENT TO [facility]  INSTEAD OF [facility]     GOOD CALL BACK   [phone]

## 2023-04-18 DIAGNOSIS — I10 PRIMARY HYPERTENSION: Chronic | ICD-10-CM

## 2023-04-18 RX ORDER — HYDROCHLOROTHIAZIDE 12.5 MG/1
12.5 CAPSULE, GELATIN COATED ORAL EVERY MORNING
Qty: 90 CAPSULE | Refills: 1 | Status: SHIPPED | OUTPATIENT
Start: 2023-04-18

## 2023-04-26 PROBLEM — I10 HYPERTENSION: Status: ACTIVE | Noted: 2023-01-13

## 2023-04-26 PROBLEM — R73.03 PREDIABETES: Status: ACTIVE | Noted: 2023-04-26

## 2023-05-07 DIAGNOSIS — I10 PRIMARY HYPERTENSION: Chronic | ICD-10-CM

## 2023-05-08 RX ORDER — AMLODIPINE BESYLATE 10 MG/1
TABLET ORAL
Qty: 90 TABLET | Refills: 3 | Status: SHIPPED | OUTPATIENT
Start: 2023-05-08

## 2023-05-08 NOTE — TELEPHONE ENCOUNTER
Pending Prescriptions:                       Disp   Refills    amLODIPine (NORVASC) 10 MG tablet [Pharmac*90 tab*0        Sig: TAKE 1 TABLET BY MOUTH EVERY DAY

## 2023-08-17 DIAGNOSIS — I10 PRIMARY HYPERTENSION: Chronic | ICD-10-CM

## 2023-08-17 RX ORDER — LOSARTAN POTASSIUM 50 MG/1
TABLET ORAL
Qty: 30 TABLET | Refills: 0 | OUTPATIENT
Start: 2023-08-17

## 2023-08-17 RX ORDER — HYDROCHLOROTHIAZIDE 12.5 MG/1
CAPSULE, GELATIN COATED ORAL
Qty: 90 CAPSULE | Refills: 1 | Status: SHIPPED | OUTPATIENT
Start: 2023-08-17

## 2023-08-17 NOTE — TELEPHONE ENCOUNTER
Pending Prescriptions:                       Disp   Refills    losartan (COZAAR) 50 MG tablet [Pharmacy M*30 tab*0        Sig: TAKE 1 TABLET BY MOUTH EVERY DAY    hydroCHLOROthiazide (MICROZIDE) 12.5 MG ca*90 cap*1        Sig: TAKE 1 CAPSULE BY MOUTH EVERY DAY IN THE MORNING

## 2023-09-18 ENCOUNTER — OFFICE VISIT (OUTPATIENT)
Dept: FAMILY MEDICINE CLINIC | Facility: CLINIC | Age: 58
End: 2023-09-18
Payer: COMMERCIAL

## 2023-09-18 VITALS
WEIGHT: 189 LBS | SYSTOLIC BLOOD PRESSURE: 123 MMHG | OXYGEN SATURATION: 98 % | BODY MASS INDEX: 31.49 KG/M2 | RESPIRATION RATE: 18 BRPM | HEART RATE: 91 BPM | HEIGHT: 65 IN | DIASTOLIC BLOOD PRESSURE: 86 MMHG | TEMPERATURE: 98 F

## 2023-09-18 DIAGNOSIS — H53.8 BLURRED VISION, LEFT EYE: ICD-10-CM

## 2023-09-18 DIAGNOSIS — R29.810 FACIAL DROOP: Primary | ICD-10-CM

## 2023-09-18 DIAGNOSIS — E66.09 CLASS 1 OBESITY DUE TO EXCESS CALORIES WITHOUT SERIOUS COMORBIDITY IN ADULT, UNSPECIFIED BMI: ICD-10-CM

## 2023-09-18 PROCEDURE — 99213 OFFICE O/P EST LOW 20 MIN: CPT | Performed by: NURSE PRACTITIONER

## 2023-09-18 RX ORDER — ENOXAPARIN SODIUM 100 MG/ML
INJECTION SUBCUTANEOUS
COMMUNITY
Start: 2023-08-29

## 2023-09-18 NOTE — PROGRESS NOTES
Subjective     Chief Complaint   Patient presents with    Eye Problem       History of Present Illness    Symptoms started at the end of July after patient had COVID.  Patient had a tooth pulled on left side of face prior to getting COVID and it healed well.  Left eye drooping and numbness under left eye.  She states it is like there is a film covering her eye causing constant blurred vision out of her left eye.  She states she is seeing floaters and has double vision at times from her left eye as well.  She is having frequent headaches.  She states she is having some word finding difficulty at times and does forget what she was talking about in the middle of a sentence at times since the other symptoms began.  Denies symptoms getting any worse or better.  Denies slurred speech or extremity weakness.  Denies numbness to tongue, difficulty chewing or swallowing, or numbness to entire left side of face (only below left eye).  Patient did recently have surgery on her foot by Dr. Nino, but records are not available at this time.  Patient was taking lovenox post-operatively.      Patient's PMR from outside medical facility reviewed and noted.    Review of Systems   HENT:  Negative for trouble swallowing.    Neurological:  Positive for facial asymmetry, speech difficulty, numbness and headaches. Negative for seizures, syncope and weakness.   Psychiatric/Behavioral:  Negative for confusion.       Otherwise complete ROS reviewed and negative except as mentioned in the HPI.    Past Medical History:   Past Medical History:   Diagnosis Date    GERD (gastroesophageal reflux disease)     Hyperlipidemia     Hypertension     Prediabetes      Past Surgical History:  Past Surgical History:   Procedure Laterality Date    ANKLE SURGERY Right      Social History:  reports that she quit smoking about 17 months ago. Her smoking use included cigarettes. She has a 5.00 pack-year smoking history. She has never used smokeless  tobacco. She reports current alcohol use. She reports that she does not use drugs.    Family History: family history includes Breast cancer in her half-sister; Colon cancer in her father; Lung cancer in her mother.      Allergies:  No Known Allergies  Medications:  Prior to Admission medications    Medication Sig Start Date End Date Taking? Authorizing Provider   Enoxaparin Sodium (LOVENOX) 40 MG/0.4ML solution prefilled syringe syringe INJECT 0.4 MILLILITER BY SUBCUTANEOUS ROUTE EVERY DAY 8/29/23  Yes Shakir Gardner MD   amLODIPine (NORVASC) 10 MG tablet TAKE 1 TABLET BY MOUTH EVERY DAY 5/8/23   Rylee Lopez APRN   azelastine (ASTELIN) 0.1 % nasal spray 2 sprays into the nostril(s) as directed by provider 2 (Two) Times a Day. Use in each nostril as directed 6/30/23   Rylee Lopez APRN   hydroCHLOROthiazide (MICROZIDE) 12.5 MG capsule TAKE 1 CAPSULE BY MOUTH EVERY DAY IN THE MORNING 8/17/23   Rylee Lopez APRN   losartan (COZAAR) 100 MG tablet TAKE 1 TABLET BY MOUTH EVERY DAY 7/17/23   Rylee Lopez APRN   meloxicam (MOBIC) 15 MG tablet Take 1 tablet by mouth Daily. 6/27/23   Shakir Gardner MD   pantoprazole (PROTONIX) 20 MG EC tablet TAKE 1 TABLET BY MOUTH EVERY DAY 4/7/23   Rylee Lopez APRN   rosuvastatin (CRESTOR) 10 MG tablet Take 1 tablet by mouth Every Night. 4/7/23   Rylee Lopez APRN       PHQ-9 Depression Screening  Little interest or pleasure in doing things? 0-->not at all   Feeling down, depressed, or hopeless? 0-->not at all   Trouble falling or staying asleep, or sleeping too much?     Feeling tired or having little energy?     Poor appetite or overeating?     Feeling bad about yourself - or that you are a failure or have let yourself or your family down?     Trouble concentrating on things, such as reading the newspaper or watching television?     Moving or speaking so slowly that other people could have  "noticed? Or the opposite - being so fidgety or restless that you have been moving around a lot more than usual?     Thoughts that you would be better off dead, or of hurting yourself in some way?     PHQ-9 Total Score 0   If you checked off any problems, how difficult have these problems made it for you to do your work, take care of things at home, or get along with other people?         PHQ-9 Total Score: 0   0 (Negative screening for depression)  Support given, observe for worsening symptoms    Objective     Vital Signs: /86 (BP Location: Left arm, Patient Position: Sitting, Cuff Size: Adult)   Pulse 91   Temp 98 °F (36.7 °C) (Infrared)   Resp 18   Ht 165.1 cm (65\")   Wt 85.7 kg (189 lb)   LMP  (LMP Unknown)   SpO2 98%   BMI 31.45 kg/m²   Physical Exam  Vitals and nursing note reviewed.   Constitutional:       Appearance: She is obese.   HENT:      Head: Normocephalic.      Nose: Nose normal.      Mouth/Throat:      Mouth: Mucous membranes are moist.   Eyes:      Conjunctiva/sclera: Conjunctivae normal.   Cardiovascular:      Rate and Rhythm: Normal rate and regular rhythm.      Pulses: Normal pulses.      Heart sounds: Normal heart sounds.   Pulmonary:      Effort: Pulmonary effort is normal.      Breath sounds: Normal breath sounds.   Musculoskeletal:         General: Normal range of motion.      Cervical back: Normal range of motion.   Skin:     General: Skin is warm and dry.   Neurological:      General: No focal deficit present.      Mental Status: She is alert and oriented to person, place, and time.      GCS: GCS eye subscore is 4. GCS verbal subscore is 5. GCS motor subscore is 6.      Cranial Nerves: Facial asymmetry (left eye and mouth droop) present.      Sensory: Sensory deficit (decreased sensation below left eye) present.      Motor: No weakness, abnormal muscle tone or pronator drift.      Coordination: Finger-Nose-Finger Test normal.      Gait: Gait abnormal (walking boot " post-operatively).   Psychiatric:         Mood and Affect: Mood normal.         Behavior: Behavior normal.            Results Reviewed:  Glucose   Date Value Ref Range Status   06/30/2023 112 (H) 65 - 99 mg/dL Final     BUN   Date Value Ref Range Status   06/30/2023 14 6 - 20 mg/dL Final     Creatinine   Date Value Ref Range Status   06/30/2023 0.75 0.57 - 1.00 mg/dL Final     Sodium   Date Value Ref Range Status   06/30/2023 142 136 - 145 mmol/L Final     Potassium   Date Value Ref Range Status   06/30/2023 4.4 3.5 - 5.2 mmol/L Final     Comment:     Slight hemolysis detected by analyzer. Results may be affected.     Chloride   Date Value Ref Range Status   06/30/2023 105 98 - 107 mmol/L Final     CO2   Date Value Ref Range Status   06/30/2023 25.0 22.0 - 29.0 mmol/L Final     Calcium   Date Value Ref Range Status   06/30/2023 10.2 8.6 - 10.5 mg/dL Final     ALT (SGPT)   Date Value Ref Range Status   06/30/2023 24 1 - 33 U/L Final     AST (SGOT)   Date Value Ref Range Status   06/30/2023 22 1 - 32 U/L Final     WBC   Date Value Ref Range Status   06/30/2023 5.47 3.40 - 10.80 10*3/mm3 Final   01/09/2023 6.2 3.4 - 10.8 x10E3/uL Final     Hematocrit   Date Value Ref Range Status   06/30/2023 40.6 34.0 - 46.6 % Final     Platelets   Date Value Ref Range Status   06/30/2023 211 140 - 450 10*3/mm3 Final     Triglycerides   Date Value Ref Range Status   01/09/2023 134 0 - 149 mg/dL Final     HDL Cholesterol   Date Value Ref Range Status   01/09/2023 67 >39 mg/dL Final     LDL Chol Calc (NIH)   Date Value Ref Range Status   01/09/2023 88 0 - 99 mg/dL Final     Hemoglobin A1C   Date Value Ref Range Status   01/09/2023 6.0 (H) 4.8 - 5.6 % Final     Comment:              Prediabetes: 5.7 - 6.4           Diabetes: >6.4           Glycemic control for adults with diabetes: <7.0           Assessment / Plan     Assessment/Plan     Diagnoses and all orders for this visit:    1. Facial droop (Primary)  -     CT Head Without  Contrast; Future    2. Blurred vision, left eye  -     Ambulatory Referral to Optometry  -     CT Head Without Contrast; Future    3. Class 1 obesity due to excess calories without serious comorbidity in adult, unspecified BMI    Assessment & Plan:  Stat head CT to rule out stroke.  If head CT is normal, will start patient on course of steroids for treatment of possible Bell's Palsy.  Referral to neurology when results of head CT is available.         An After Visit Summary was printed and given to the patient at discharge.  Return if symptoms worsen or fail to improve.    I have discussed the patient results/orders and plan/recommendation with them at today's visit.      Rylee Lopez, APRN   09/18/2023

## 2023-09-22 DIAGNOSIS — H53.8 BLURRED VISION, LEFT EYE: ICD-10-CM

## 2023-09-22 DIAGNOSIS — J32.0 CHRONIC MAXILLARY SINUSITIS: Primary | ICD-10-CM

## 2023-09-22 DIAGNOSIS — R29.810 FACIAL DROOP: ICD-10-CM

## 2023-09-22 DIAGNOSIS — G51.0 BELL'S PALSY: Primary | ICD-10-CM

## 2023-09-22 RX ORDER — PREDNISONE 20 MG/1
TABLET ORAL
Qty: 23 TABLET | Refills: 0 | Status: SHIPPED | OUTPATIENT
Start: 2023-09-22 | End: 2023-10-02

## 2023-12-07 ENCOUNTER — OFFICE VISIT (OUTPATIENT)
Dept: OTOLARYNGOLOGY | Facility: CLINIC | Age: 58
End: 2023-12-07
Payer: COMMERCIAL

## 2023-12-07 VITALS
HEIGHT: 65 IN | DIASTOLIC BLOOD PRESSURE: 89 MMHG | TEMPERATURE: 97.8 F | BODY MASS INDEX: 32.65 KG/M2 | WEIGHT: 196 LBS | RESPIRATION RATE: 16 BRPM | SYSTOLIC BLOOD PRESSURE: 145 MMHG | HEART RATE: 73 BPM

## 2023-12-07 DIAGNOSIS — J32.9 CHRONIC RHINOSINUSITIS: Primary | ICD-10-CM

## 2023-12-07 DIAGNOSIS — K21.9 GASTROESOPHAGEAL REFLUX DISEASE WITHOUT ESOPHAGITIS: Chronic | ICD-10-CM

## 2023-12-07 DIAGNOSIS — E66.09 CLASS 1 OBESITY DUE TO EXCESS CALORIES WITHOUT SERIOUS COMORBIDITY WITH BODY MASS INDEX (BMI) OF 32.0 TO 32.9 IN ADULT: Chronic | ICD-10-CM

## 2023-12-07 RX ORDER — AMOXICILLIN 500 MG/1
500 CAPSULE ORAL 3 TIMES DAILY
Qty: 30 CAPSULE | Refills: 0 | Status: SHIPPED | OUTPATIENT
Start: 2023-12-07 | End: 2023-12-17

## 2023-12-07 NOTE — PROGRESS NOTES
PROCEDURE NOTE    Olivia Solares    DATE OF PROCEDURE: 12/7/2023    PROCEDURE:   Bilateral Diagnostic Rigid Nasal Endoscopy    PREPROCEDURE DIAGNOSIS:   Chronic rhinosinusitis    POSTPROCEDURE DIAGNOSIS:  SAME    ANESTHESIA:   None    PROCEDURE DESCRIPTION:    With the patient in the chair bilateral diagnostic rigid nasal endoscopy was performed with the Stortz 0° endoscope without difficulty.     An endoscope was passed along the left nasal floor to the nasopharynx.  It was then passed into the region of the middle meatus, middle turbinate, and the sphenoethmoid region. An identical procedure was performed on the right side.  The following findings were noted as stated below:    Findings: Moderate crusting with dryness bilaterally associated with lateralization of the left middle turbinate and bilateral inferior turbinate hypertrophy.  Thick mucus bilaterally which is relatively clear.  Lateralization of the right middle turbinate is noted with inflammation around the middle meatus but no evidence of intranasal polyposis bilaterally.    Condition:  Stable.  Patient tolerated procedure well.    Complications:  None  There was no significant bleeding.

## 2023-12-07 NOTE — PATIENT INSTRUCTIONS
Amoxicillin for 10 days prior to scan  Follow-up and 4 to 6 weeks  Continue Astelin and Flonase    Importance of whole food plant-based diet discussed as well as control of diabetes and reflux disease    CT scan of the paranasal sinuses on follow-up

## 2023-12-07 NOTE — PROGRESS NOTES
YOB: 1965  Location: [facility]  Location Address: [address] [place] [address], [facility], [address] [place] [address]  Location Phone: [phone]    Chief Complaint   Patient presents with    Sinus Problem       History of Present Illness  Olviia Solares is a 58 y.o. female.  Olivia Solares is here for evaluation of ENT complaints. The patient has had problems with nasal congestion, repeated sinusitis, sinus pressure, postnasal drip, facial pain, facial pressure, and headaches, a sore throat  The symptoms are not localized to a particular location. The patient has had moderate to severe symptoms. The symptoms have been present for the last several months There have been no identified factors that aggravate the symptoms. There have been no factors that have improved the symptoms. The patient has not had testing. She has been using flonase, astelin and allegra with no relief.      When she takes an antibiotic she improves but then recurs within 1 to 2 weeks and is now better than she was before.  She complains of dryness and crusting.  She drinks a lot of milk.     Past Medical History:   Diagnosis Date    Allergic rhinitis     All my life    Asthma     All my life    Dental disease     Dizziness     GERD (gastroesophageal reflux disease)     Headache     Hyperlipidemia     Hypertension     Nosebleed     Prediabetes        Past Surgical History:   Procedure Laterality Date    ANKLE SURGERY Right        Outpatient Medications Marked as Taking for the 23 encounter (Office Visit) with Swapnil Garcia MD   Medication Sig Dispense Refill    amLODIPine (NORVASC) 10 MG tablet TAKE 1 TABLET BY MOUTH EVERY DAY 90 tablet 3    azelastine (ASTELIN) 0.1 % nasal spray 2 sprays into the nostril(s) as directed by provider 2 (Two) Times a Day. Use in each nostril as directed 30 mL 12    hydroCHLOROthiazide (MICROZIDE) 12.5 MG capsule TAKE 1 CAPSULE BY MOUTH EVERY DAY IN THE MORNING 90 capsule 1    losartan (COZAAR) 100  MG tablet TAKE 1 TABLET BY MOUTH EVERY DAY 90 tablet 1    pantoprazole (PROTONIX) 20 MG EC tablet TAKE 1 TABLET BY MOUTH EVERY DAY (Patient taking differently: As Needed.) 90 tablet 2    rosuvastatin (CRESTOR) 10 MG tablet Take 1 tablet by mouth Every Night. 90 tablet 2       Patient has no known allergies.    Family History   Problem Relation Age of Onset    Lung cancer Mother     Cancer Mother     Diabetes Mother     Hypertension Mother     Colon cancer Father     Cancer Father     Breast cancer Half-Sister        Social History     Socioeconomic History    Marital status:    Tobacco Use    Smoking status: Former     Packs/day: 0.25     Years: 38.00     Additional pack years: 0.00     Total pack years: 9.50     Types: Cigarettes     Start date: 1984     Quit date: 2022     Years since quittin.6    Smokeless tobacco: Never   Vaping Use    Vaping Use: Never used   Substance and Sexual Activity    Alcohol use: Yes     Alcohol/week: 2.0 standard drinks of alcohol     Types: 2 Shots of liquor per week    Drug use: Never    Sexual activity: Yes     Partners: Male     Birth control/protection: Post-menopausal       Review of Systems   Constitutional: Negative.    HENT:  Positive for congestion, postnasal drip, rhinorrhea, sinus pressure, sinus pain and sore throat.    Eyes: Negative.    Respiratory: Negative.     Cardiovascular: Negative.    Gastrointestinal: Negative.    Endocrine: Negative.    Genitourinary: Negative.    Musculoskeletal: Negative.    Skin: Negative.    Allergic/Immunologic: Positive for environmental allergies.   Neurological:  Positive for headaches.   Hematological: Negative.    Psychiatric/Behavioral: Negative.         Vitals:    23 1019   BP: 145/89   Pulse: 73   Resp: 16   Temp: 97.8 °F (36.6 °C)       Body mass index is 32.62 kg/m².    Objective     Physical Exam  CONSTITUTIONAL: well nourished, well-developed, alert, oriented, in no acute distress     COMMUNICATION AND  VOICE: able to communicate normally, normal voice quality    HEAD: normocephalic, no lesions, atraumatic, no tenderness, no masses     FACE: appearance normal, no lesions, no tenderness, no deformities, facial motion symmetric    EYES: ocular motility normal, eyelids normal, orbits normal, no proptosis, conjunctiva normal , pupils equal, round     EARS:  Hearing: hearing to conversational voice intact bilaterally   External Ears: normal bilaterally, no lesions    NOSE:  External Nose: external nasal structure normal, no tenderness on palpation, no nasal discharge, no lesions, no evidence of trauma, nostrils patent   Intranasal exam: See endoscopy    ORAL:  Lips: upper and lower lips without lesion     NECK:  Inspection and Palpation: neck appearance normal, no masses or tenderness    CHEST/RESPIRATORY: normal respiratory effort     CARDIOVASCULAR: no cyanosis or edema     NEUROLOGICAL/PSYCHIATRIC: oriented to time, place and person, mood normal, affect appropriate, CN II-XII intact grossly     Assessment & Plan   Diagnoses and all orders for this visit:    1. Chronic rhinosinusitis (Primary)  -     CT Sinus Without Contrast    2. Class 1 obesity due to excess calories without serious comorbidity with body mass index (BMI) of 32.0 to 32.9 in adult  -     CT Sinus Without Contrast    3. Gastroesophageal reflux disease without esophagitis  -     CT Sinus Without Contrast    Other orders  -     amoxicillin (AMOXIL) 500 MG capsule; Take 1 capsule by mouth 3 (Three) Times a Day for 10 days. Antibiotics to begin 10 days prior to scheduled CT scan  Dispense: 30 capsule; Refill: 0      * Surgery not found *  Orders Placed This Encounter   Procedures    CT Sinus Without Contrast     Order Specific Question:   Release to patient     Answer:   Routine Release [[id number]]     Return in about 4 weeks (around 1/4/2024).       Patient Instructions   Amoxicillin for 10 days prior to scan  Follow-up and 4 to 6 weeks  Continue  Astelin and Flonase    Importance of whole food plant-based diet discussed as well as control of diabetes and reflux disease    CT scan of the paranasal sinuses on follow-up

## 2024-01-12 ENCOUNTER — TELEPHONE (OUTPATIENT)
Dept: NEUROLOGY | Facility: CLINIC | Age: 59
End: 2024-01-12
Payer: COMMERCIAL

## 2024-01-12 NOTE — TELEPHONE ENCOUNTER
confirmed appt for Monday with Vamshi adviced of weather closure, we will contact if we close. adviced to arrive early for NP paperwork.

## 2024-01-23 ENCOUNTER — HOSPITAL ENCOUNTER (OUTPATIENT)
Dept: CT IMAGING | Facility: HOSPITAL | Age: 59
Discharge: HOME | End: 2024-01-23
Admitting: OTOLARYNGOLOGY
Payer: COMMERCIAL

## 2024-01-23 PROCEDURE — 70486 CT MAXILLOFACIAL W/O DYE: CPT

## 2024-02-22 ENCOUNTER — OFFICE VISIT (OUTPATIENT)
Dept: OTOLARYNGOLOGY | Facility: CLINIC | Age: 59
End: 2024-02-22
Payer: COMMERCIAL

## 2024-02-22 VITALS
DIASTOLIC BLOOD PRESSURE: 94 MMHG | HEART RATE: 86 BPM | WEIGHT: 207 LBS | HEIGHT: 65 IN | TEMPERATURE: 98.4 F | BODY MASS INDEX: 34.49 KG/M2 | SYSTOLIC BLOOD PRESSURE: 155 MMHG

## 2024-02-22 DIAGNOSIS — R53.83 FATIGUE, UNSPECIFIED TYPE: ICD-10-CM

## 2024-02-22 DIAGNOSIS — J34.2 NASAL SEPTAL DEVIATION: ICD-10-CM

## 2024-02-22 DIAGNOSIS — K21.9 GASTROESOPHAGEAL REFLUX DISEASE WITHOUT ESOPHAGITIS: ICD-10-CM

## 2024-02-22 DIAGNOSIS — R06.83 SNORING: ICD-10-CM

## 2024-02-22 DIAGNOSIS — J32.9 CHRONIC RHINOSINUSITIS: Primary | ICD-10-CM

## 2024-02-22 DIAGNOSIS — E66.09 CLASS 1 OBESITY DUE TO EXCESS CALORIES WITHOUT SERIOUS COMORBIDITY WITH BODY MASS INDEX (BMI) OF 32.0 TO 32.9 IN ADULT: ICD-10-CM

## 2024-02-22 DIAGNOSIS — J34.89 NASAL SEPTAL SPUR: ICD-10-CM

## 2024-02-22 NOTE — PROGRESS NOTES
YOB: 1965  Location: [facility]  Location Address: [address] [place] [address], [facility] [address], [address] [place] [address]  Location Phone: [phone]    Chief Complaint   Patient presents with    Chronic rhinosinusitis     F/u after CT       History of Present Illness  Olivia Solares is a 59 y.o. female.  Olivia Solares is here for follow up of ENT complaints. The patient has had problems with nasal dryness and headaches   She complains of pain around her maxillary area and crown of her head   She is using nasal sprays daily, but does not feel as if these improve symptoms    She completed antibiotics prior to ct scan   Patient does report snoring at night as well as daytime fatigue. She states at times she does wake herself up snoring     Patient reports she is currently taking reflux medications, but is not sure of the name     CT Sinus Without Contrast (2024 12:25)       Past Medical History:   Diagnosis Date    Allergic rhinitis     All my life    Asthma     All my life    Dental disease     Dizziness     GERD (gastroesophageal reflux disease)     Headache     Hyperlipidemia     Hypertension     Nosebleed     Prediabetes        Past Surgical History:   Procedure Laterality Date    ANKLE SURGERY Right        Outpatient Medications Marked as Taking for the 24 encounter (Office Visit) with Swapnil Garcia MD   Medication Sig Dispense Refill    amLODIPine (NORVASC) 10 MG tablet TAKE 1 TABLET BY MOUTH EVERY DAY 90 tablet 3    azelastine (ASTELIN) 0.1 % nasal spray 2 sprays into the nostril(s) as directed by provider 2 (Two) Times a Day. Use in each nostril as directed 30 mL 12    hydroCHLOROthiazide (MICROZIDE) 12.5 MG capsule TAKE 1 CAPSULE BY MOUTH EVERY DAY IN THE MORNING 90 capsule 1    losartan (COZAAR) 100 MG tablet TAKE 1 TABLET BY MOUTH EVERY DAY 90 tablet 1    mupirocin (BACTROBAN) 2 % nasal ointment Apply to the nose twice daily-okay to repeat 14-day course if crusting still present 3 g 0     rosuvastatin (CRESTOR) 10 MG tablet Take 1 tablet by mouth Every Night. 90 tablet 2       Patient has no known allergies.    Family History   Problem Relation Age of Onset    Lung cancer Mother     Cancer Mother     Diabetes Mother     Hypertension Mother     Colon cancer Father     Cancer Father     Breast cancer Half-Sister        Social History     Socioeconomic History    Marital status:    Tobacco Use    Smoking status: Former     Packs/day: 0.25     Years: 38.00     Additional pack years: 0.00     Total pack years: 9.50     Types: Cigarettes     Start date: 1984     Quit date: 2022     Years since quittin.8    Smokeless tobacco: Never   Vaping Use    Vaping Use: Never used   Substance and Sexual Activity    Alcohol use: Yes     Alcohol/week: 2.0 standard drinks of alcohol     Types: 2 Shots of liquor per week     Comment: occ    Drug use: Never    Sexual activity: Yes     Partners: Male     Birth control/protection: Post-menopausal       Review of Systems   Constitutional:  Positive for fatigue.   HENT:  Positive for congestion and sinus pressure.    Neurological:  Positive for headaches.       Vitals:    24 1443   BP: 155/94   Pulse: 86   Temp: 98.4 °F (36.9 °C)       Body mass index is 34.45 kg/m².    Objective     Physical Exam  Vitals reviewed.   Constitutional:       Appearance: She is obese.   HENT:      Head: Normocephalic.      Right Ear: Tympanic membrane, ear canal and external ear normal.      Left Ear: Tympanic membrane, ear canal and external ear normal.      Nose: Septal deviation present.      Comments: Bilateral nasal septal dryness        Mouth/Throat:      Lips: Pink.      Mouth: Mucous membranes are moist.      Comments: [place] II/III     Neurological:      Mental Status: She is alert.       Dr. Garcia has examined and assessed the patient and agrees with current treatment plan        Assessment & Plan   Diagnoses and all orders for this visit:    1. Chronic  rhinosinusitis (Primary)    2. Class 1 obesity due to excess calories without serious comorbidity with body mass index (BMI) of 32.0 to 32.9 in adult  -     Polysomnography 4 or More Parameters; Future    3. Gastroesophageal reflux disease without esophagitis    4. Fatigue, unspecified type  -     Polysomnography 4 or More Parameters; Future    5. Snoring  -     Polysomnography 4 or More Parameters; Future    6. Nasal septal deviation    7. Nasal septal spur    Other orders  -     mupirocin (BACTROBAN) 2 % nasal ointment; Apply to the nose twice daily-okay to repeat 14-day course if crusting still present  Dispense: 3 g; Refill: 0      * Surgery not found *  Orders Placed This Encounter   Procedures    Polysomnography 4 or More Parameters     Standing Status:   Future     Standing Expiration Date:   2/22/2025     Order Specific Question:   Split Night     Answer:   No     Order Specific Question:   May take own meds     Answer:   Yes     Order Specific Question:   Details     Answer:   O2 Implementation per Protocol     Order Specific Question:   Release to patient     Answer:   Routine Release [[id number]]     No follow-ups on file.     Discussed sleep study   Possible septoplasty with video sleep endoscopy based on sleep study results   Continue nasal sprays as well as reflux precautions  Mupirocin as directed        Patient Instructions   For the best response, use your nasal sprays every day without skipping doses. It may take several weeks before the full effect is acheived.

## 2024-02-23 ENCOUNTER — TELEPHONE (OUTPATIENT)
Dept: OTOLARYNGOLOGY | Facility: CLINIC | Age: 59
End: 2024-02-23
Payer: COMMERCIAL

## 2024-03-25 ENCOUNTER — OFFICE VISIT (OUTPATIENT)
Age: 59
End: 2024-03-25
Payer: COMMERCIAL

## 2024-03-25 VITALS
WEIGHT: 203 LBS | BODY MASS INDEX: 33.82 KG/M2 | HEIGHT: 65 IN | SYSTOLIC BLOOD PRESSURE: 162 MMHG | DIASTOLIC BLOOD PRESSURE: 98 MMHG

## 2024-03-25 DIAGNOSIS — N89.8 VAGINA ITCHING: ICD-10-CM

## 2024-03-25 DIAGNOSIS — N90.4 LICHEN SCLEROSUS OF FEMALE GENITALIA: ICD-10-CM

## 2024-03-25 DIAGNOSIS — Z12.31 SCREENING MAMMOGRAM FOR BREAST CANCER: ICD-10-CM

## 2024-03-25 DIAGNOSIS — Z01.419 WELL WOMAN EXAM WITH ROUTINE GYNECOLOGICAL EXAM: Primary | ICD-10-CM

## 2024-03-25 DIAGNOSIS — Z78.9 NON-SMOKER: ICD-10-CM

## 2024-03-25 DIAGNOSIS — B37.31 YEAST VAGINITIS: ICD-10-CM

## 2024-03-25 PROCEDURE — 99214 OFFICE O/P EST MOD 30 MIN: CPT | Performed by: OBSTETRICS & GYNECOLOGY

## 2024-03-25 PROCEDURE — 99396 PREV VISIT EST AGE 40-64: CPT | Performed by: OBSTETRICS & GYNECOLOGY

## 2024-03-25 RX ORDER — CLOBETASOL PROPIONATE 0.5 MG/G
1 CREAM TOPICAL 2 TIMES DAILY
Qty: 30 G | Refills: 1 | Status: SHIPPED | OUTPATIENT
Start: 2024-03-25 | End: 2024-03-27

## 2024-03-25 RX ORDER — FLUCONAZOLE 150 MG/1
150 TABLET ORAL DAILY
Qty: 1 TABLET | Refills: 3 | Status: SHIPPED | OUTPATIENT
Start: 2024-03-25 | End: 2024-03-26

## 2024-03-25 NOTE — PROGRESS NOTES
"Chief Complaint  Gynecologic Exam (Pt here for annual and denies any problems, last pap 03/22/2023 negative cotesting, last mammogram 02/07/2023 normal)    Subjective        Olivia Solares presents to [facility]  History of Present Illness     Patient presents today for yearly exam  Self breast exam, diet, exercise, multivitamin use all discussed  All of her questions were answered to her liking  [facility] guidelines were reviewed for Pap smear surveillance  She is not due for her Pap smear     She does complain of vaginal itching      Objective   Vital Signs:  /98 (BP Location: Left arm, Patient Position: Sitting, Cuff Size: Large Adult)   Ht 165.1 cm (65\")   Wt 92.1 kg (203 lb)   BMI 33.78 kg/m²   Estimated body mass index is 33.78 kg/m² as calculated from the following:    Height as of this encounter: 165.1 cm (65\").    Weight as of this encounter: 92.1 kg (203 lb).               Physical Exam  Vitals and nursing note reviewed. Exam conducted with a chaperone present.   Constitutional:       Appearance: Normal appearance.   HENT:      Head: Normocephalic and atraumatic.      Mouth/Throat:      Mouth: Mucous membranes are moist.   Eyes:      Extraocular Movements: Extraocular movements intact.      Pupils: Pupils are equal, round, and reactive to light.   Neck:      Vascular: No carotid bruit.   Cardiovascular:      Rate and Rhythm: Normal rate and regular rhythm.      Pulses: Normal pulses.      Heart sounds: Normal heart sounds. No murmur heard.     No friction rub. No gallop.   Pulmonary:      Effort: Pulmonary effort is normal. No respiratory distress.      Breath sounds: Normal breath sounds. No stridor. No wheezing, rhonchi or rales.   Chest:      Chest wall: No tenderness.   Breasts:     Breasts are symmetrical.      Right: Normal. No swelling, bleeding, inverted nipple, mass, nipple discharge, skin change or tenderness.      Left: Normal. No swelling, bleeding, inverted nipple, " mass, nipple discharge, skin change or tenderness.   Abdominal:      General: Abdomen is flat. Bowel sounds are normal. There is no distension.      Palpations: Abdomen is soft. There is no mass.      Tenderness: There is no abdominal tenderness. There is no right CVA tenderness, left CVA tenderness, guarding or rebound.      Hernia: No hernia is present. There is no hernia in the left inguinal area or right inguinal area.   Genitourinary:     General: Normal vulva.      Exam position: Lithotomy position.      Pubic Area: No rash or pubic lice.       Labia:         Right: No rash, tenderness, lesion or injury.         Left: No rash, tenderness, lesion or injury.       Urethra: No prolapse, urethral pain, urethral swelling or urethral lesion.      Vagina: Normal.      Cervix: Normal.      Uterus: Normal. Not deviated, not enlarged, not fixed, not tender and no uterine prolapse.       Adnexa: Right adnexa normal and left adnexa normal.        Right: No mass, tenderness or fullness.          Left: No mass, tenderness or fullness.        Comments: Findings consistent with yeast infection.  There is discoloration on the perineum consistent with lichen sclerosis.  Musculoskeletal:         General: Normal range of motion.      Cervical back: Normal range of motion and neck supple. No rigidity. No muscular tenderness.   Lymphadenopathy:      Cervical: No cervical adenopathy.      Upper Body:      Right upper body: No supraclavicular, axillary or pectoral adenopathy.      Left upper body: No supraclavicular, axillary or pectoral adenopathy.      Lower Body: No right inguinal adenopathy. No left inguinal adenopathy.   Skin:     General: Skin is warm and dry.   Neurological:      General: No focal deficit present.      Mental Status: She is alert and oriented to person, place, and time. Mental status is at baseline.   Psychiatric:         Mood and Affect: Mood normal.         Behavior: Behavior normal.         Thought Content:  Thought content normal.         Judgment: Judgment normal.        Result Review :                     Assessment and Plan     Diagnoses and all orders for this visit:    1. Well woman exam with routine gynecological exam (Primary)    2. Screening mammogram for breast cancer  -     Mammo Screening Digital Tomosynthesis Bilateral With CAD    3. Vagina itching    4. Yeast vaginitis    5. Lichen sclerosus of female genitalia    6. Non-smoker    Other orders  -     fluconazole (Diflucan) 150 MG tablet; Take 1 tablet by mouth Daily for 1 dose.  Dispense: 1 tablet; Refill: 3  -     clobetasol propionate (TEMOVATE) 0.05 % cream; Apply 1 Application topically to the appropriate area as directed 2 (Two) Times a Day.  Dispense: 30 g; Refill: 1             Follow Up     Return in about 1 month (around 4/25/2024) for Telehealth, with me.  Patient was given instructions and counseling regarding her condition or for health maintenance advice. Please see specific information pulled into the AVS if appropriate.     Diflucan  If no better, Temovate cream  Call for concerns or questions    Pablo Allison MD

## 2024-03-27 ENCOUNTER — TELEPHONE (OUTPATIENT)
Dept: FAMILY MEDICINE CLINIC | Facility: CLINIC | Age: 59
End: 2024-03-27
Payer: COMMERCIAL

## 2024-03-27 DIAGNOSIS — E78.5 HYPERLIPIDEMIA, UNSPECIFIED HYPERLIPIDEMIA TYPE: Chronic | ICD-10-CM

## 2024-03-27 DIAGNOSIS — K21.9 GASTROESOPHAGEAL REFLUX DISEASE, UNSPECIFIED WHETHER ESOPHAGITIS PRESENT: Chronic | ICD-10-CM

## 2024-03-27 DIAGNOSIS — M25.50 ARTHRALGIA, UNSPECIFIED JOINT: Primary | ICD-10-CM

## 2024-03-27 DIAGNOSIS — I10 PRIMARY HYPERTENSION: Chronic | ICD-10-CM

## 2024-03-27 RX ORDER — HYDROCHLOROTHIAZIDE 12.5 MG/1
12.5 CAPSULE, GELATIN COATED ORAL EVERY MORNING
Qty: 90 CAPSULE | Refills: 0 | Status: SHIPPED | OUTPATIENT
Start: 2024-03-27

## 2024-03-27 RX ORDER — LOSARTAN POTASSIUM 100 MG/1
100 TABLET ORAL DAILY
Qty: 90 TABLET | Refills: 0 | Status: SHIPPED | OUTPATIENT
Start: 2024-03-27

## 2024-03-27 RX ORDER — ROSUVASTATIN CALCIUM 10 MG/1
10 TABLET, COATED ORAL NIGHTLY
Qty: 90 TABLET | Refills: 0 | Status: SHIPPED | OUTPATIENT
Start: 2024-03-27

## 2024-03-27 RX ORDER — AMLODIPINE BESYLATE 10 MG/1
10 TABLET ORAL DAILY
Qty: 90 TABLET | Refills: 0 | Status: SHIPPED | OUTPATIENT
Start: 2024-03-27

## 2024-03-27 RX ORDER — PANTOPRAZOLE SODIUM 20 MG/1
20 TABLET, DELAYED RELEASE ORAL DAILY
Qty: 90 TABLET | Refills: 0 | Status: SHIPPED | OUTPATIENT
Start: 2024-03-27

## 2024-03-27 RX ORDER — MELOXICAM 15 MG/1
15 TABLET ORAL DAILY
Qty: 90 TABLET | Refills: 0 | Status: SHIPPED | OUTPATIENT
Start: 2024-03-27 | End: 2024-06-25

## 2024-03-27 NOTE — TELEPHONE ENCOUNTER
Patient came in to office today to request a refill with 90 day supply of medications to [facility].  Medications sent.  Patient will need to be seen in clinic before that 90 days for her annual physical.

## 2024-03-28 NOTE — TELEPHONE ENCOUNTER
"Relay     \"Called PT to schedule her a Physical and fasting lab appointment per Rylee this needs to be within a 90 day window from 03/27/2024. Na, nvm \"              ;l  "

## 2024-04-17 ENCOUNTER — TELEPHONE (OUTPATIENT)
Dept: OTOLARYNGOLOGY | Facility: CLINIC | Age: 59
End: 2024-04-17

## 2024-04-17 NOTE — TELEPHONE ENCOUNTER
The [facility] received a fax that requires your attention. The document has been indexed to the patient’s chart for your review.      Reason for sending: SLEEP STUDY AUTHORIZATION# [id number] FOR DOS 4/13/24 IN SLEEP LAB    Documents Description: SLEEP STUDY AUTH_[email or web address]_04/11/24    Name of Sender: MIKKIRACHEL    Date Indexed: 04/17/24

## 2024-04-18 LAB
NCCN CRITERIA FLAG: NORMAL
TYRER CUZICK SCORE: 11.1

## 2024-04-23 ENCOUNTER — HOSPITAL ENCOUNTER (OUTPATIENT)
Dept: MAMMOGRAPHY | Facility: HOSPITAL | Age: 59
Discharge: HOME | End: 2024-04-23
Admitting: OBSTETRICS & GYNECOLOGY
Payer: COMMERCIAL

## 2024-04-23 PROCEDURE — 77063 BREAST TOMOSYNTHESIS BI: CPT

## 2024-04-23 PROCEDURE — 77067 SCR MAMMO BI INCL CAD: CPT

## 2024-04-30 ENCOUNTER — HOSPITAL ENCOUNTER (OUTPATIENT)
Dept: SLEEP CENTER | Age: 59
Discharge: HOME | End: 2024-05-02
Payer: COMMERCIAL

## 2024-04-30 PROCEDURE — 95810 POLYSOM 6/> YRS 4/> PARAM: CPT

## 2024-05-01 NOTE — PROGRESS NOTES
[facility]  [facility] [place]  [address]  Phone [phone] Fax [phone]     Sleep Study Technician Review    Patient Name:  Maddie Solares  :   1965  Referring Provider: Radha Randle APRN *    [facility] Fall Risk Assessment    Have you fallen in the past year? YES[] NO[x]  Do you feel unsteady when standing or walking? YES[] NO[x]  Are you worried about falling? YES[] NO[x]     aFall Risk screening requirement has been met    Not at risk for falls.    Brief History:  Maddie Solares is a 59 y.o. female with a history of Asthma, Fatigue, GERD, HTN, Headache, Obesity, and Snoring who presented for a diagnostic PSG.     Height:   65\"  Weight: 207lbs  BMI:  34.5  Neck Circ: 14.5\"  Mallampati  4  ESS:  5    Type of Study: PSG  Time Stage Position Snore Hypopnea Obs Apnea Delia Apnea PAP O2   2209 Wake Left No No No No N/A RA   2300 N2 Right Yes No No No N/A RA   2400 N2 Right Yes No No No N/A RA   0100 REM/N2 Left Yes Yes No No N/A RA   0200 N2 Left Yes Yes No No N/A RA   0300 N2/REM Right Yes Yes Yes No N/A RA   0400 N2 Left Yes Yes Yes No N/A RA   0500 REM Left Yes Yes No No N/A RA     Summary: Pt arrived at the sleep center on time. Tech introduced self, verified pt's name and , and escorted pt to room. Tech explained procedure and answered questions. Pt was instructed in supine sleep. Pt verbalized understanding of procedure. Pt was prepared for PSG per protocol without complication. Sleep latency was within normal limits. The SpO2 radha was 80%. A marked increase in AHI was noted in REM sleep.  EKG showed NSR. Limb movements were noted. Nocturia x 1.      DME: [facility]         The study was reviewed briefly with Maddie Solares.  Ms. Solares will be notified of the formal results and recommendations after the study is scored and interpreted.  The report will be sent to the patient's referring provider.    Technician: PATTI Hua

## 2024-05-02 ENCOUNTER — OFFICE VISIT (OUTPATIENT)
Dept: NEUROLOGY | Facility: CLINIC | Age: 59
End: 2024-05-02
Payer: COMMERCIAL

## 2024-05-02 VITALS
HEART RATE: 82 BPM | BODY MASS INDEX: 33.66 KG/M2 | OXYGEN SATURATION: 95 % | DIASTOLIC BLOOD PRESSURE: 90 MMHG | SYSTOLIC BLOOD PRESSURE: 162 MMHG | WEIGHT: 202 LBS | HEIGHT: 65 IN

## 2024-05-02 DIAGNOSIS — R29.810 FACIAL WEAKNESS: Primary | ICD-10-CM

## 2024-05-02 DIAGNOSIS — R20.0 NUMBNESS IN FEET: ICD-10-CM

## 2024-05-02 DIAGNOSIS — R20.0 NUMBNESS AND TINGLING IN BOTH HANDS: ICD-10-CM

## 2024-05-02 DIAGNOSIS — R20.2 NUMBNESS AND TINGLING IN BOTH HANDS: ICD-10-CM

## 2024-05-02 NOTE — ASSESSMENT & PLAN NOTE
She has generalized numbness in her feet that doesn't fit a dermatomal distribution.  We will utilize NCS for further evaluation.  She does have some complaints of back pain but has no other findings on examination to concern me for lumbar etiology.

## 2024-05-02 NOTE — ASSESSMENT & PLAN NOTE
Mrs. Solares endorses some left facial weakness that occurred back in July 2023 after having a COVID infection.  She notes that she initially felt it was primarily in her eyelid but she also had some weakness of the lower and upper face as well.  She denies getting to the point of complete paralysis of her right face.  She notes that she has gotten better now but is still having some weakness and asymmetry of her face.  She has had CT head negative for stroke or any intercranial mass lesion.    She describes some symptoms that would be consistent with Bell's Palsy although some of her description doesn't fit this exactly.  On examination I am able to note asymmetry of her left face with flattening of the nasolabial fold, decreased movement and some drooping of her forehead/eyebrow, and some eyelid drooping.  She is able to fully close her eye, however.  Other than some numbness, as described below, there was no other concerns with her neurological examination.    At this point I feel we need to further rule out any other intercranial causes to her symptoms with MRI.  This would be more sensitive for causes such as CVA, MS, and lesion although I feel that mass lesion and MS would be very unlikely. I did discuss this with her in full detail and she is understanding.

## 2024-05-02 NOTE — PROGRESS NOTES
Neurology Consult Note    Referring Provider:   Rylee Lopez APRN    Reason for Consultation:    Drooping Eyelid  Blurred Vision  Numbness    Subjective   History of Present Illness:  Olivia Solares is a 59 y.o. female who presents today for the above concerns.  She is routinely followed by Rylee Lopez APRN for primary care.     She notes that symptoms initially started in July 2023.  She notes that she had a COVID infection and she started to have eyelid drooping, facial drooping, and blurred vision.  She additionally notes that she would have some floaters in her vision.  She denies any double vision.  She also notes that she feels that her left eyelid is drooping over her eye making it difficult to see.  She notes that she had a CT of the head that resulted with no significant findings.      She endorses numbness in her hands in a median nerve distribution.  She works with her hands often using a computer.  She also notes bilateral numbness in both feet that initially started in her toes.  She is prediabetic.     Allergies:    Patient has no known allergies.    Medications:  Current Outpatient Medications   Medication Sig Dispense Refill    amLODIPine (NORVASC) 10 MG tablet Take 1 tablet by mouth Daily. 90 tablet 0    azelastine (ASTELIN) 0.1 % nasal spray 2 sprays into the nostril(s) as directed by provider 2 (Two) Times a Day. Use in each nostril as directed 30 mL 12    hydroCHLOROthiazide (MICROZIDE) 12.5 MG capsule Take 1 capsule by mouth Every Morning. 90 capsule 0    losartan (COZAAR) 100 MG tablet Take 1 tablet by mouth Daily. 90 tablet 0    meloxicam (MOBIC) 15 MG tablet Take 1 tablet by mouth Daily for 90 days. 90 tablet 0    pantoprazole (PROTONIX) 20 MG EC tablet Take 1 tablet by mouth Daily. 90 tablet 0    rosuvastatin (CRESTOR) 10 MG tablet Take 1 tablet by mouth Every Night. 90 tablet 0     No current facility-administered medications for this visit.     Current  "outpatient and discharge medications have been reconciled for the patient.  Reviewed by: CHELSEA Kaba    Past Medical History:  Past Medical History:   Diagnosis Date    Allergic rhinitis     All my life    Asthma     All my life    Dental disease     Dizziness     GERD (gastroesophageal reflux disease)     Headache     Hyperlipidemia     Hypertension     Nosebleed     Prediabetes      Past Surgical History:   Procedure Laterality Date    ANKLE SURGERY Right      Family History   Problem Relation Age of Onset    Lung cancer Mother     Cancer Mother     Diabetes Mother     Hypertension Mother     Colon cancer Father     Cancer Father     Breast cancer Half-Sister      Social History     Tobacco Use    Smoking status: Former     Current packs/day: 0.00     Average packs/day: 0.3 packs/day for 38.2 years (9.6 ttl pk-yrs)     Types: Cigarettes     Start date: 1984     Quit date: 2022     Years since quittin.0    Smokeless tobacco: Never   Vaping Use    Vaping status: Never Used   Substance Use Topics    Alcohol use: Yes     Alcohol/week: 2.0 standard drinks of alcohol     Types: 2 Shots of liquor per week     Comment: occ    Drug use: Never     Review of Systems   Eyes:  Positive for blurred vision.   Neurological:  Positive for facial asymmetry and numbness.         Objective   Vital Signs:  Heart Rate:  [82] 82  BP: (162)/(90) 162/90      24  0925   Weight: 91.6 kg (202 lb)     165.1 cm (65\")  Body mass index is 33.61 kg/m².    Physical Exam  Vitals reviewed.   Constitutional:       Appearance: Normal appearance.   HENT:      Head: Normocephalic.      Mouth/Throat:      Pharynx: Oropharynx is clear.   Eyes:      General: Lids are normal.      Extraocular Movements: Extraocular movements intact.      Pupils: Pupils are equal, round, and reactive to light.   Cardiovascular:      Rate and Rhythm: Normal rate and regular rhythm.      Pulses: Normal pulses.   Pulmonary:      Effort: Pulmonary " effort is normal.   Musculoskeletal:         General: Normal range of motion.      Cervical back: Normal range of motion and neck supple.   Skin:     General: Skin is warm and dry.      Capillary Refill: Capillary refill takes less than 2 seconds.   Neurological:      Motor: Motor strength is normal.     Coordination: Coordination is intact.      Deep Tendon Reflexes: Reflexes are normal and symmetric.   Psychiatric:         Mood and Affect: Mood normal.         Speech: Speech normal.       Neurological Exam  Mental Status  Awake, alert and oriented to person, place and time. Recent and remote memory are intact. Speech is normal. Language is fluent with no aphasia. Attention and concentration are normal.    Cranial Nerves  CN II: Visual acuity is normal. Visual fields full to confrontation.  CN III, IV, VI: Extraocular movements intact bilaterally. Normal lids and orbits bilaterally. Pupils equal round and reactive to light bilaterally.  CN V: Facial sensation is normal.  CN VII:  Right: There is no facial weakness.  Left: There is peripheral facial weakness.  CN IX, X: Palate elevates symmetrically. Normal gag reflex.  CN XI: Shoulder shrug strength is normal.  CN XII: Tongue midline without atrophy or fasciculations.    Motor   Strength is 5/5 throughout all four extremities.    Sensory  Right: Loss of sensation in the median nerve distribution.  Left: Loss of sensation in the median nerve distribution.  Generalized decreased sensation in bilateral lower extremities with light tough and minimal pinprick sensation.    Reflexes  Deep tendon reflexes are 2+ and symmetric in all four extremities.    Coordination    Finger-to-nose, rapid alternating movements and heel-to-shin normal bilaterally without dysmetria.    Gait  Normal casual, toe, heel and tandem gait.    Results Review:    Lab Results   Component Value Date    GLUCOSE 112 (H) 06/30/2023    BUN 14 06/30/2023    CREATININE 0.75 06/30/2023    BCR 18.7  06/30/2023    K 4.4 06/30/2023    CO2 25.0 06/30/2023    CALCIUM 10.2 06/30/2023    PROTENTOTREF 7.0 01/09/2023    ALBUMIN 4.6 06/30/2023    LABIL2 2.0 01/09/2023    AST 22 06/30/2023    ALT 24 06/30/2023     Lab Results   Component Value Date    WBC 5.47 06/30/2023    HGB 13.1 06/30/2023    HCT 40.6 06/30/2023    .3 (H) 06/30/2023     06/30/2023     Lab Results   Component Value Date    CHLPL 178 01/09/2023    TRIG 134 01/09/2023    HDL 67 01/09/2023    LDL 88 01/09/2023     Lab Results   Component Value Date    TSH 1.010 01/09/2023     Lab Results   Component Value Date    HGBA1C 6.0 (H) 01/09/2023     Lab Results   Component Value Date    FOLATE 6.2 01/23/2023     Lab Results   Component Value Date    FQJSGAPU29 551 01/23/2023     CT Head Without Contrast (09/22/2023 00:00)     Chart Review:  Office Visit with Rylee Lopez APRN (09/18/2023)      Plan   Diagnoses and all orders for this visit:    1. Facial weakness (Primary)  Assessment & Plan:  Mrs. Solares endorses some left facial weakness that occurred back in July 2023 after having a COVID infection.  She notes that she initially felt it was primarily in her eyelid but she also had some weakness of the lower and upper face as well.  She denies getting to the point of complete paralysis of her right face.  She notes that she has gotten better now but is still having some weakness and asymmetry of her face.  She has had CT head negative for stroke or any intercranial mass lesion.    She describes some symptoms that would be consistent with Bell's Palsy although some of her description doesn't fit this exactly.  On examination I am able to note asymmetry of her left face with flattening of the nasolabial fold, decreased movement and some drooping of her forehead/eyebrow, and some eyelid drooping.  She is able to fully close her eye, however.  Other than some numbness, as described below, there was no other concerns with her  neurological examination.    At this point I feel we need to further rule out any other intercranial causes to her symptoms with MRI.  This would be more sensitive for causes such as CVA, MS, and lesion although I feel that mass lesion and MS would be very unlikely. I did discuss this with her in full detail and she is understanding.    Orders:  -     MRI Brain With & Without Contrast; Future    2. Numbness and tingling in both hands  Assessment & Plan:  She complains of numbness/tingling in her hands that fit a median distribution.  She does use her hands a lot for work.  We will get NCS for further evaluation of carpal tunnel.     Orders:  -     EMG & Nerve Conduction Test; Future    3. Numbness in feet  Assessment & Plan:  She has generalized numbness in her feet that doesn't fit a dermatomal distribution.  We will utilize NCS for further evaluation.  She does have some complaints of back pain but has no other findings on examination to concern me for lumbar etiology.      Orders:  -     EMG & Nerve Conduction Test; Future    Follow-Up:  Return in about 6 weeks (around 6/13/2024) for Facial Weakness, Numbness.         Vamshi Yadav, CHELSEA  05/02/24  10:23 CDT

## 2024-05-02 NOTE — ASSESSMENT & PLAN NOTE
She complains of numbness/tingling in her hands that fit a median distribution.  She does use her hands a lot for work.  We will get NCS for further evaluation of carpal tunnel.    no

## 2024-05-07 DIAGNOSIS — R20.0 NUMBNESS IN FEET: ICD-10-CM

## 2024-05-07 DIAGNOSIS — R20.0 NUMBNESS AND TINGLING IN BOTH HANDS: Primary | ICD-10-CM

## 2024-05-07 DIAGNOSIS — R20.2 NUMBNESS AND TINGLING IN BOTH HANDS: Primary | ICD-10-CM

## 2024-05-07 DIAGNOSIS — G47.33 OSA (OBSTRUCTIVE SLEEP APNEA): Primary | ICD-10-CM

## 2024-05-09 ENCOUNTER — FOLLOWUP TELEPHONE ENCOUNTER (OUTPATIENT)
Dept: SLEEP CENTER | Age: 59
End: 2024-05-09

## 2024-05-09 NOTE — PROGRESS NOTES
Lm with sleep study results for patient, orders sent to [facility] for sleep equipment setup.

## 2024-05-14 ENCOUNTER — TELEPHONE (OUTPATIENT)
Dept: OTOLARYNGOLOGY | Facility: CLINIC | Age: 59
End: 2024-05-14

## 2024-05-14 NOTE — TELEPHONE ENCOUNTER
Provider: CHELSEA SANTOS      The [facility] received a fax that requires your attention. The document has been indexed to the patient's chart for your review.     Reason for sending: REVIEW     Documents Description: POLYSOMNOGRAPHY REPORT 4/30/24     Name of Sender: [facility]     Date Indexed: 05/14/24

## 2024-05-18 DIAGNOSIS — I10 PRIMARY HYPERTENSION: Chronic | ICD-10-CM

## 2024-05-20 RX ORDER — AMLODIPINE BESYLATE 10 MG/1
10 TABLET ORAL DAILY
Qty: 90 TABLET | Refills: 3 | OUTPATIENT
Start: 2024-05-20

## 2024-05-20 NOTE — TELEPHONE ENCOUNTER
Pending Prescriptions:                       Disp   Refills    amLODIPine (NORVASC) 10 MG tablet [Pharmac*90 tab*3        Sig: TAKE 1 TABLET BY MOUTH EVERY DAY

## 2024-05-30 ENCOUNTER — TELEPHONE (OUTPATIENT)
Dept: OTOLARYNGOLOGY | Facility: CLINIC | Age: 59
End: 2024-05-30

## 2024-05-30 ENCOUNTER — HOSPITAL ENCOUNTER (OUTPATIENT)
Dept: MRI IMAGING | Facility: HOSPITAL | Age: 59
Discharge: HOME | End: 2024-05-30
Admitting: NURSE PRACTITIONER
Payer: COMMERCIAL

## 2024-05-30 DIAGNOSIS — R29.810 FACIAL WEAKNESS: ICD-10-CM

## 2024-05-30 PROCEDURE — 0 GADOBENATE DIMEGLUMINE 529 MG/ML SOLUTION: Performed by: NURSE PRACTITIONER

## 2024-05-30 PROCEDURE — A9577 INJ MULTIHANCE: HCPCS | Performed by: NURSE PRACTITIONER

## 2024-05-30 PROCEDURE — 70553 MRI BRAIN STEM W/O & W/DYE: CPT

## 2024-05-30 RX ADMIN — GADOBENATE DIMEGLUMINE 20 ML: 529 INJECTION, SOLUTION INTRAVENOUS at 16:08

## 2024-05-30 NOTE — TELEPHONE ENCOUNTER
Caller: Olivia Solares    Relationship to patient: Self    Best call back number: [phone]    Chief complaint: NEEDED TO BE AFTER MRI    Type of visit: FOLLOW UP W/ PRIOR    Requested date: ATLEAST A WEEK FFROM MRI (5/30/24)     If rescheduling, when is the original appointment: 5/30/24 3P    Additional notes:OK TO LEAVE A

## 2024-06-18 ENCOUNTER — TELEPHONE (OUTPATIENT)
Dept: NEUROLOGY | Facility: CLINIC | Age: 59
End: 2024-06-18

## 2024-06-18 NOTE — TELEPHONE ENCOUNTER
Provider: JOSE FRANCISCO    Caller: CAROLINA    Phone Number: [phone]     Reason for Call: PT CALLED AND CANCELLED SAME DAY APPT. PT STATES THAT SHE IS SICK AND DID RESCHEDULE.    THANK YOU

## 2024-06-27 ENCOUNTER — OFFICE VISIT (OUTPATIENT)
Dept: OTOLARYNGOLOGY | Facility: CLINIC | Age: 59
End: 2024-06-27
Payer: COMMERCIAL

## 2024-06-27 VITALS
WEIGHT: 201 LBS | BODY MASS INDEX: 33.49 KG/M2 | TEMPERATURE: 98 F | DIASTOLIC BLOOD PRESSURE: 86 MMHG | SYSTOLIC BLOOD PRESSURE: 136 MMHG | HEART RATE: 75 BPM | HEIGHT: 65 IN

## 2024-06-27 DIAGNOSIS — J34.89 NASAL SEPTAL SPUR: ICD-10-CM

## 2024-06-27 DIAGNOSIS — J34.1: ICD-10-CM

## 2024-06-27 DIAGNOSIS — J34.2 NASAL SEPTAL DEVIATION: ICD-10-CM

## 2024-06-27 DIAGNOSIS — J32.9 CHRONIC RHINOSINUSITIS: Primary | ICD-10-CM

## 2024-06-27 DIAGNOSIS — E66.09 CLASS 1 OBESITY DUE TO EXCESS CALORIES WITHOUT SERIOUS COMORBIDITY WITH BODY MASS INDEX (BMI) OF 32.0 TO 32.9 IN ADULT: ICD-10-CM

## 2024-06-27 DIAGNOSIS — R53.83 FATIGUE, UNSPECIFIED TYPE: ICD-10-CM

## 2024-06-27 DIAGNOSIS — R06.83 SNORING: ICD-10-CM

## 2024-06-27 DIAGNOSIS — G47.33 OSA (OBSTRUCTIVE SLEEP APNEA): ICD-10-CM

## 2024-06-27 NOTE — PATIENT INSTRUCTIONS
SEPTOPLASTY AND TURBINOPLASTY: A septoplasty and inferior turbinoplasty were recommended. The risks and benefits were explained including but not limited to pain, bleeding, infection, risks of the general anesthesia, continued septal deviation, crusting, congestion and septal perforation. Possibilities of continued preoperative symptoms and the possible need for revision surgery and or medical therapy were discussed. Alternatives were discussed. No guarantees were made or implied. Questions were asked appropriately answered.

## 2024-06-27 NOTE — PROGRESS NOTES
YOB: 1965  Location: [facility]  Location Address: [address] [place] [address], [facility], [address] [place] [address]  Location Phone: [phone]    Chief Complaint   Patient presents with    Sleep Apnea       History of Present Illness  Olivia Solares is a 59 y.o. female.  Olivia Solares is here for follow up of ENT complaints. The patient has had problems with nasal dryness and headaches   She complains of ongoing pain around her maxillary area and at times the crown of her head. She has tried daily nasal sprays without significant improvement   She has had sleep study since last visit and has tried cpap without success. She continues to remove mask during sleep       AHI 20.3 2024  BMI: 33.45           Past Medical History:   Diagnosis Date    Allergic rhinitis     All my life    Asthma     All my life    Dental disease     Dizziness     GERD (gastroesophageal reflux disease)     Headache     Hyperlipidemia     Hypertension     Nosebleed     Prediabetes        Past Surgical History:   Procedure Laterality Date    ANKLE SURGERY Right        Outpatient Medications Marked as Taking for the 24 encounter (Office Visit) with Swapnil Garcia MD   Medication Sig Dispense Refill    amLODIPine (NORVASC) 10 MG tablet Take 1 tablet by mouth Daily. 90 tablet 0    azelastine (ASTELIN) 0.1 % nasal spray 2 sprays into the nostril(s) as directed by provider 2 (Two) Times a Day. Use in each nostril as directed 30 mL 12    hydroCHLOROthiazide (MICROZIDE) 12.5 MG capsule Take 1 capsule by mouth Every Morning. 90 capsule 0    losartan (COZAAR) 100 MG tablet Take 1 tablet by mouth Daily. 90 tablet 0    pantoprazole (PROTONIX) 20 MG EC tablet Take 1 tablet by mouth Daily. 90 tablet 0    rosuvastatin (CRESTOR) 10 MG tablet Take 1 tablet by mouth Every Night. 90 tablet 0       Patient has no known allergies.    Family History   Problem Relation Age of Onset    Lung cancer Mother     Cancer Mother     Diabetes Mother      Hypertension Mother     Colon cancer Father     Cancer Father     Breast cancer Half-Sister        Social History     Socioeconomic History    Marital status:    Tobacco Use    Smoking status: Former     Current packs/day: 0.00     Average packs/day: 0.3 packs/day for 38.2 years (9.6 ttl pk-yrs)     Types: Cigarettes     Start date: 1984     Quit date: 2022     Years since quittin.2    Smokeless tobacco: Never   Vaping Use    Vaping status: Never Used   Substance and Sexual Activity    Alcohol use: Yes     Alcohol/week: 2.0 standard drinks of alcohol     Types: 2 Shots of liquor per week     Comment: occ    Drug use: Never    Sexual activity: Yes     Partners: Male     Birth control/protection: Post-menopausal       Review of Systems   Constitutional:  Positive for fatigue.   HENT:  Positive for congestion and sinus pressure.    Psychiatric/Behavioral:  Positive for sleep disturbance.        Vitals:    24 1356   BP: 136/86   Pulse: 75   Temp: 98 °F (36.7 °C)       Body mass index is 33.45 kg/m².    Objective     Physical Exam  Vitals reviewed.   Constitutional:       Appearance: She is obese.   HENT:      Head: Normocephalic.      Right Ear: External ear normal.      Left Ear: External ear normal.      Nose: Septal deviation present.      Right Turbinates: Enlarged.      Left Turbinates: Enlarged.      Comments: Septal spur       Mouth/Throat:      Lips: Pink.      Mouth: Mucous membranes are moist.      Comments: Montes De Oca III     Neurological:      Mental Status: She is alert.       Dr. Garcia has examined and assessed the patient and agrees with current treatment plan      Assessment & Plan   Diagnoses and all orders for this visit:    1. Chronic rhinosinusitis (Primary)  -     Case Request; Standing  -     Follow Anesthesia Guidelines / Protocol; Standing  -     Verify NPO Status; Standing  -     Obtain Informed Consent; Standing  -     SCD (Sequential Compression Device) - To Be Placed on  Patient in Pre-Op; Standing  -     Patient to Void Prior to Transfer to OR; Standing  -     Instructions for Nursing; Standing  -     Comprehensive Metabolic Panel; Standing  -     CBC & Differential; Standing  -     ECG 12 Lead Pre-Op / Pre-Procedure; Standing  -     XR Chest PA & Lateral; Standing  -     Case Request    2. Nasal septal deviation  -     Case Request; Standing  -     Follow Anesthesia Guidelines / Protocol; Standing  -     Verify NPO Status; Standing  -     Obtain Informed Consent; Standing  -     SCD (Sequential Compression Device) - To Be Placed on Patient in Pre-Op; Standing  -     Patient to Void Prior to Transfer to OR; Standing  -     Instructions for Nursing; Standing  -     Comprehensive Metabolic Panel; Standing  -     CBC & Differential; Standing  -     ECG 12 Lead Pre-Op / Pre-Procedure; Standing  -     XR Chest PA & Lateral; Standing  -     Case Request    3. Nasal septal spur  -     Case Request; Standing  -     Follow Anesthesia Guidelines / Protocol; Standing  -     Verify NPO Status; Standing  -     Obtain Informed Consent; Standing  -     SCD (Sequential Compression Device) - To Be Placed on Patient in Pre-Op; Standing  -     Patient to Void Prior to Transfer to OR; Standing  -     Instructions for Nursing; Standing  -     Comprehensive Metabolic Panel; Standing  -     CBC & Differential; Standing  -     ECG 12 Lead Pre-Op / Pre-Procedure; Standing  -     XR Chest PA & Lateral; Standing  -     Case Request    4. Antral cyst of maxilla  -     Case Request; Standing  -     Follow Anesthesia Guidelines / Protocol; Standing  -     Verify NPO Status; Standing  -     Obtain Informed Consent; Standing  -     SCD (Sequential Compression Device) - To Be Placed on Patient in Pre-Op; Standing  -     Patient to Void Prior to Transfer to OR; Standing  -     Instructions for Nursing; Standing  -     Comprehensive Metabolic Panel; Standing  -     CBC & Differential; Standing  -     ECG 12 Lead  Pre-Op / Pre-Procedure; Standing  -     XR Chest PA & Lateral; Standing  -     Case Request    5. Class 1 obesity due to excess calories without serious comorbidity with body mass index (BMI) of 32.0 to 32.9 in adult  -     Case Request; Standing  -     Follow Anesthesia Guidelines / Protocol; Standing  -     Verify NPO Status; Standing  -     Obtain Informed Consent; Standing  -     SCD (Sequential Compression Device) - To Be Placed on Patient in Pre-Op; Standing  -     Patient to Void Prior to Transfer to OR; Standing  -     Instructions for Nursing; Standing  -     Comprehensive Metabolic Panel; Standing  -     CBC & Differential; Standing  -     ECG 12 Lead Pre-Op / Pre-Procedure; Standing  -     XR Chest PA & Lateral; Standing  -     Case Request    6. Fatigue, unspecified type  -     Case Request; Standing  -     Follow Anesthesia Guidelines / Protocol; Standing  -     Verify NPO Status; Standing  -     Obtain Informed Consent; Standing  -     SCD (Sequential Compression Device) - To Be Placed on Patient in Pre-Op; Standing  -     Patient to Void Prior to Transfer to OR; Standing  -     Instructions for Nursing; Standing  -     Comprehensive Metabolic Panel; Standing  -     CBC & Differential; Standing  -     ECG 12 Lead Pre-Op / Pre-Procedure; Standing  -     XR Chest PA & Lateral; Standing  -     Case Request    7. Snoring  -     Case Request; Standing  -     Follow Anesthesia Guidelines / Protocol; Standing  -     Verify NPO Status; Standing  -     Obtain Informed Consent; Standing  -     SCD (Sequential Compression Device) - To Be Placed on Patient in Pre-Op; Standing  -     Patient to Void Prior to Transfer to OR; Standing  -     Instructions for Nursing; Standing  -     Comprehensive Metabolic Panel; Standing  -     CBC & Differential; Standing  -     ECG 12 Lead Pre-Op / Pre-Procedure; Standing  -     XR Chest PA & Lateral; Standing  -     Case Request    8. FADUMO (obstructive sleep apnea)  -     Case  Request; Standing  -     Follow Anesthesia Guidelines / Protocol; Standing  -     Verify NPO Status; Standing  -     Obtain Informed Consent; Standing  -     SCD (Sequential Compression Device) - To Be Placed on Patient in Pre-Op; Standing  -     Patient to Void Prior to Transfer to OR; Standing  -     Instructions for Nursing; Standing  -     Comprehensive Metabolic Panel; Standing  -     CBC & Differential; Standing  -     ECG 12 Lead Pre-Op / Pre-Procedure; Standing  -     XR Chest PA & Lateral; Standing  -     Case Request      IMAGE GUIDED SEPTOPLASTY, RESECTION INFERIOR TURBINATES WITH LEFT ANTROSTOMY WITH RESECTION OF ANTRAL CYST (N/A), VIDEO SLEEP ENDOSCOPY (N/A)  No orders of the defined types were placed in this encounter.    No follow-ups on file.     Risks vs benefits of septoplasty, inferior turbinate reduction with left antrostomy with resection of antral cyst with video sleep endoscopy discussed patient wishes to proceed     Patient Instructions   SEPTOPLASTY AND TURBINOPLASTY: A septoplasty and inferior turbinoplasty were recommended. The risks and benefits were explained including but not limited to pain, bleeding, infection, risks of the general anesthesia, continued septal deviation, crusting, congestion and septal perforation. Possibilities of continued preoperative symptoms and the possible need for revision surgery and or medical therapy were discussed. Alternatives were discussed. No guarantees were made or implied. Questions were asked appropriately answered.

## 2024-07-02 PROBLEM — J34.2 NASAL SEPTAL DEVIATION: Status: ACTIVE | Noted: 2024-06-27

## 2024-07-02 PROBLEM — G47.33 OSA (OBSTRUCTIVE SLEEP APNEA): Status: ACTIVE | Noted: 2024-06-27

## 2024-07-02 PROBLEM — R53.83 FATIGUE: Status: ACTIVE | Noted: 2024-06-27

## 2024-07-02 PROBLEM — R06.83 SNORING: Status: ACTIVE | Noted: 2024-06-27

## 2024-07-02 PROBLEM — J34.1: Status: ACTIVE | Noted: 2024-06-27

## 2024-07-02 PROBLEM — J34.89 NASAL SEPTAL SPUR: Status: ACTIVE | Noted: 2024-06-27

## 2024-07-09 ENCOUNTER — DOCUMENTATION (OUTPATIENT)
Dept: NEUROLOGY | Facility: HOSPITAL | Age: 59
End: 2024-07-09
Payer: COMMERCIAL

## 2024-08-12 ENCOUNTER — TELEPHONE (OUTPATIENT)
Dept: NEUROLOGY | Facility: CLINIC | Age: 59
End: 2024-08-12
Payer: COMMERCIAL

## 2024-08-12 NOTE — TELEPHONE ENCOUNTER
CALLED PATIENT TO LET THEM KNOW THAT [facility] SCHEDULE HAS HAD TO BE CHANGED FROM THURSDAY/FRIDAY TO TUESDAY/WEDNESDAY SO I WOULD NEED TO MOVE THEIR APPOINTMENT THEY HAVE NEXT WEEK TO A DIFFERENT DAY. PATIENT VOICED UNDERSTANDING. MOVED TO 8/21 @ 1030. SHE IS AWARE OF NEW DATE AND TIME.

## 2024-08-13 ENCOUNTER — OFFICE VISIT (OUTPATIENT)
Dept: FAMILY MEDICINE CLINIC | Facility: CLINIC | Age: 59
End: 2024-08-13
Payer: COMMERCIAL

## 2024-08-13 VITALS
SYSTOLIC BLOOD PRESSURE: 139 MMHG | BODY MASS INDEX: 33.82 KG/M2 | WEIGHT: 203 LBS | DIASTOLIC BLOOD PRESSURE: 85 MMHG | RESPIRATION RATE: 18 BRPM | HEIGHT: 65 IN | OXYGEN SATURATION: 98 % | TEMPERATURE: 98 F | HEART RATE: 69 BPM

## 2024-08-13 DIAGNOSIS — E66.09 CLASS 1 OBESITY DUE TO EXCESS CALORIES WITHOUT SERIOUS COMORBIDITY WITH BODY MASS INDEX (BMI) OF 33.0 TO 33.9 IN ADULT: ICD-10-CM

## 2024-08-13 DIAGNOSIS — K21.9 GASTROESOPHAGEAL REFLUX DISEASE, UNSPECIFIED WHETHER ESOPHAGITIS PRESENT: Chronic | ICD-10-CM

## 2024-08-13 DIAGNOSIS — E78.5 HYPERLIPIDEMIA, UNSPECIFIED HYPERLIPIDEMIA TYPE: Chronic | ICD-10-CM

## 2024-08-13 DIAGNOSIS — J32.9 CHRONIC RHINOSINUSITIS: ICD-10-CM

## 2024-08-13 DIAGNOSIS — I10 PRIMARY HYPERTENSION: Chronic | ICD-10-CM

## 2024-08-13 DIAGNOSIS — H10.33 ACUTE BACTERIAL CONJUNCTIVITIS OF BOTH EYES: Primary | ICD-10-CM

## 2024-08-13 PROCEDURE — 99214 OFFICE O/P EST MOD 30 MIN: CPT | Performed by: NURSE PRACTITIONER

## 2024-08-13 RX ORDER — ROSUVASTATIN CALCIUM 10 MG/1
10 TABLET, COATED ORAL NIGHTLY
Qty: 90 TABLET | Refills: 0 | Status: SHIPPED | OUTPATIENT
Start: 2024-08-13

## 2024-08-13 RX ORDER — HYDROCHLOROTHIAZIDE 12.5 MG/1
12.5 CAPSULE, GELATIN COATED ORAL EVERY MORNING
Qty: 90 CAPSULE | Refills: 0 | Status: SHIPPED | OUTPATIENT
Start: 2024-08-13

## 2024-08-13 RX ORDER — PANTOPRAZOLE SODIUM 20 MG/1
20 TABLET, DELAYED RELEASE ORAL DAILY
Qty: 90 TABLET | Refills: 0 | Status: SHIPPED | OUTPATIENT
Start: 2024-08-13

## 2024-08-13 RX ORDER — TOBRAMYCIN 3 MG/ML
2 SOLUTION/ DROPS OPHTHALMIC
Qty: 4 ML | Refills: 0 | Status: SHIPPED | OUTPATIENT
Start: 2024-08-13 | End: 2024-08-20

## 2024-08-13 RX ORDER — AZELASTINE 1 MG/ML
2 SPRAY, METERED NASAL 2 TIMES DAILY
Qty: 30 ML | Refills: 12 | Status: SHIPPED | OUTPATIENT
Start: 2024-08-13

## 2024-08-13 RX ORDER — AMLODIPINE BESYLATE 10 MG/1
10 TABLET ORAL DAILY
Qty: 90 TABLET | Refills: 0 | Status: SHIPPED | OUTPATIENT
Start: 2024-08-13

## 2024-08-13 RX ORDER — LOSARTAN POTASSIUM 100 MG/1
100 TABLET ORAL DAILY
Qty: 90 TABLET | Refills: 0 | Status: SHIPPED | OUTPATIENT
Start: 2024-08-13

## 2024-08-13 NOTE — PROGRESS NOTES
Subjective     Chief Complaint   Patient presents with    Eye Problem    Med Refill     All medications         History of Present Illness    Possible infection in bilateral eyes after her son accidentally hit/scratched her eye.  Eyes are red, swollen, and draining and she now has light sensitivity.  Denies vision changes.  Left eye is worse, but it is spreading to the right eye.  Patient is concerned she has an infection in her eyes.     She is also requesting refills on her medications at this time.      Patient's PMR from outside medical facility reviewed and noted.    Review of Systems     Otherwise complete ROS reviewed and negative except as mentioned in the HPI.    Past Medical History:   Past Medical History:   Diagnosis Date    Allergic rhinitis     All my life    Asthma     All my life    Dental disease     Dizziness     GERD (gastroesophageal reflux disease)     Headache     Hyperlipidemia     Hypertension     Nosebleed     Prediabetes      Past Surgical History:  Past Surgical History:   Procedure Laterality Date    ANKLE SURGERY Right      Social History:  reports that she quit smoking about 2 years ago. Her smoking use included cigarettes. She started smoking about 40 years ago. She has a 9.6 pack-year smoking history. She has never used smokeless tobacco. She reports current alcohol use of about 2.0 standard drinks of alcohol per week. She reports that she does not use drugs.    Family History: family history includes Breast cancer in her half-sister; Cancer in her father and mother; Colon cancer in her father; Diabetes in her mother; Hypertension in her mother; Lung cancer in her mother.      Allergies:  No Known Allergies  Medications:  Prior to Admission medications    Medication Sig Start Date End Date Taking? Authorizing Provider   amLODIPine (NORVASC) 10 MG tablet Take 1 tablet by mouth Daily. 3/27/24  Yes Rylee Lopez APRN   azelastine (ASTELIN) 0.1 % nasal spray 2 sprays  "into the nostril(s) as directed by provider 2 (Two) Times a Day. Use in each nostril as directed 6/30/23  Yes Rylee Lopez APRN   hydroCHLOROthiazide (MICROZIDE) 12.5 MG capsule Take 1 capsule by mouth Every Morning. 3/27/24  Yes Rylee Lopez APRN   losartan (COZAAR) 100 MG tablet Take 1 tablet by mouth Daily. 3/27/24  Yes Rylee Lopez APRN   pantoprazole (PROTONIX) 20 MG EC tablet Take 1 tablet by mouth Daily. 3/27/24  Yes Rylee Lopez APRN   rosuvastatin (CRESTOR) 10 MG tablet Take 1 tablet by mouth Every Night. 3/27/24  Yes Rylee Lopez APRN       OSVALDO:        PHQ-9 Depression Screening  Little interest or pleasure in doing things?     Feeling down, depressed, or hopeless?     Trouble falling or staying asleep, or sleeping too much?     Feeling tired or having little energy?     Poor appetite or overeating?     Feeling bad about yourself - or that you are a failure or have let yourself or your family down?     Trouble concentrating on things, such as reading the newspaper or watching television?     Moving or speaking so slowly that other people could have noticed? Or the opposite - being so fidgety or restless that you have been moving around a lot more than usual?     Thoughts that you would be better off dead, or of hurting yourself in some way?     PHQ-9 Total Score     If you checked off any problems, how difficult have these problems made it for you to do your work, take care of things at home, or get along with other people?         PHQ-9 Total Score:           Objective     Vital Signs: /85 (BP Location: Right arm, Patient Position: Sitting, Cuff Size: Adult)   Pulse 69   Temp 98 °F (36.7 °C) (Infrared)   Resp 18   Ht 165.1 cm (65\")   Wt 92.1 kg (203 lb)   LMP  (LMP Unknown)   SpO2 98%   BMI 33.78 kg/m²   Physical Exam  Vitals and nursing note reviewed.   Constitutional:       Appearance: She is morbidly obese.   HENT:      " Head: Normocephalic.      Nose: No congestion.   Eyes:      General:         Right eye: Discharge present.         Left eye: Discharge present.     Conjunctiva/sclera:      Right eye: Right conjunctiva is injected.      Left eye: Left conjunctiva is injected.      Comments: Left periorbital edema.     Cardiovascular:      Rate and Rhythm: Normal rate and regular rhythm.      Pulses: Normal pulses.      Heart sounds: Normal heart sounds.   Pulmonary:      Effort: Pulmonary effort is normal.      Breath sounds: Normal breath sounds.   Musculoskeletal:         General: Normal range of motion.   Skin:     General: Skin is warm and dry.   Neurological:      General: No focal deficit present.      Mental Status: She is alert and oriented to person, place, and time.   Psychiatric:         Mood and Affect: Mood normal.         Behavior: Behavior normal.       Advance Care Planning            Results Reviewed:  Glucose   Date Value Ref Range Status   06/30/2023 112 (H) 65 - 99 mg/dL Final     BUN   Date Value Ref Range Status   06/30/2023 14 6 - 20 mg/dL Final     Creatinine   Date Value Ref Range Status   06/30/2023 0.75 0.57 - 1.00 mg/dL Final     Sodium   Date Value Ref Range Status   06/30/2023 142 136 - 145 mmol/L Final     Potassium   Date Value Ref Range Status   06/30/2023 4.4 3.5 - 5.2 mmol/L Final     Comment:     Slight hemolysis detected by analyzer. Results may be affected.     Chloride   Date Value Ref Range Status   06/30/2023 105 98 - 107 mmol/L Final     CO2   Date Value Ref Range Status   06/30/2023 25.0 22.0 - 29.0 mmol/L Final     Calcium   Date Value Ref Range Status   06/30/2023 10.2 8.6 - 10.5 mg/dL Final     ALT (SGPT)   Date Value Ref Range Status   06/30/2023 24 1 - 33 U/L Final     AST (SGOT)   Date Value Ref Range Status   06/30/2023 22 1 - 32 U/L Final     WBC   Date Value Ref Range Status   06/30/2023 5.47 3.40 - 10.80 10*3/mm3 Final   01/09/2023 6.2 3.4 - 10.8 x10E3/uL Final     Hematocrit    Date Value Ref Range Status   06/30/2023 40.6 34.0 - 46.6 % Final     Platelets   Date Value Ref Range Status   06/30/2023 211 140 - 450 10*3/mm3 Final     Triglycerides   Date Value Ref Range Status   01/09/2023 134 0 - 149 mg/dL Final     HDL Cholesterol   Date Value Ref Range Status   01/09/2023 67 >39 mg/dL Final     LDL Chol Calc (NIH)   Date Value Ref Range Status   01/09/2023 88 0 - 99 mg/dL Final     Hemoglobin A1C   Date Value Ref Range Status   01/09/2023 6.0 (H) 4.8 - 5.6 % Final     Comment:              Prediabetes: 5.7 - 6.4           Diabetes: >6.4           Glycemic control for adults with diabetes: <7.0           Assessment / Plan     Assessment/Plan     Diagnoses and all orders for this visit:    1. Acute bacterial conjunctivitis of both eyes (Primary)  -     tobramycin (Tobrex) 0.3 % solution ophthalmic solution; Administer 2 drops to both eyes Every 4 (Four) Hours While Awake for 7 days.  Dispense: 4 mL; Refill: 0    2. Primary hypertension  -     amLODIPine (NORVASC) 10 MG tablet; Take 1 tablet by mouth Daily.  Dispense: 90 tablet; Refill: 0  -     hydroCHLOROthiazide (MICROZIDE) 12.5 MG capsule; Take 1 capsule by mouth Every Morning.  Dispense: 90 capsule; Refill: 0  -     losartan (COZAAR) 100 MG tablet; Take 1 tablet by mouth Daily.  Dispense: 90 tablet; Refill: 0  - Blood pressure is mildly elevated today but may be secondary to patient's discomfort related to her eyes.  Will recheck blood pressure in 4 weeks at annual physical.    3. Gastroesophageal reflux disease, unspecified whether esophagitis present  -     pantoprazole (PROTONIX) 20 MG EC tablet; Take 1 tablet by mouth Daily.  Dispense: 90 tablet; Refill: 0    4. Hyperlipidemia, unspecified hyperlipidemia type  -     rosuvastatin (CRESTOR) 10 MG tablet; Take 1 tablet by mouth Every Night.  Dispense: 90 tablet; Refill: 0    5. Class 1 obesity due to excess calories without serious comorbidity with body mass index (BMI) of 33.0 to  33.9 in adult    6. Chronic rhinosinusitis  -     azelastine (ASTELIN) 0.1 % nasal spray; 2 sprays into the nostril(s) as directed by provider 2 (Two) Times a Day. Use in each nostril as directed  Dispense: 30 mL; Refill: 12               An After Visit Summary was printed and given to the patient at discharge.  Return in about 4 weeks (around 9/10/2024) for Annual physical.    I have discussed the patient results/orders and plan/recommendation with them at today's visit.      Rylee Lopez, CHELSEA   08/13/2024

## 2024-08-16 ENCOUNTER — OFFICE VISIT (OUTPATIENT)
Dept: FAMILY MEDICINE CLINIC | Facility: CLINIC | Age: 59
End: 2024-08-16
Payer: COMMERCIAL

## 2024-08-16 VITALS
HEART RATE: 91 BPM | DIASTOLIC BLOOD PRESSURE: 97 MMHG | OXYGEN SATURATION: 97 % | TEMPERATURE: 98.2 F | SYSTOLIC BLOOD PRESSURE: 159 MMHG | BODY MASS INDEX: 33.99 KG/M2 | RESPIRATION RATE: 18 BRPM | WEIGHT: 204 LBS | HEIGHT: 65 IN

## 2024-08-16 DIAGNOSIS — E66.09 CLASS 1 OBESITY DUE TO EXCESS CALORIES WITHOUT SERIOUS COMORBIDITY WITH BODY MASS INDEX (BMI) OF 33.0 TO 33.9 IN ADULT: ICD-10-CM

## 2024-08-16 DIAGNOSIS — L03.213 PERIORBITAL CELLULITIS OF LEFT EYE: Primary | ICD-10-CM

## 2024-08-16 PROCEDURE — 99213 OFFICE O/P EST LOW 20 MIN: CPT | Performed by: NURSE PRACTITIONER

## 2024-08-16 RX ORDER — AMOXICILLIN AND CLAVULANATE POTASSIUM 875; 125 MG/1; MG/1
1 TABLET, FILM COATED ORAL 2 TIMES DAILY
Qty: 14 TABLET | Refills: 0 | Status: SHIPPED | OUTPATIENT
Start: 2024-08-16 | End: 2024-08-23

## 2024-08-16 NOTE — PROGRESS NOTES
Subjective     Chief Complaint   Patient presents with    Eye Problem     Worse from previous visit         Eye Problem     Patient presents today with worsening left eye pain, erythema, swelling, and light sensitivity from last week.  She also reports now having some blurry vision in the left eye. Patient has been scratched in the eye on accident by her son last week and was treated with Tobrex eye drops.  Denies improvement with Tobrex.      Patient's PMR from outside medical facility reviewed and noted.    Review of Systems     Otherwise complete ROS reviewed and negative except as mentioned in the HPI.    Past Medical History:   Past Medical History:   Diagnosis Date    Allergic rhinitis     All my life    Asthma     All my life    Chronic rhinosinusitis 08/2024    Dental disease     Dizziness     GERD (gastroesophageal reflux disease)     Hyperlipidemia     Hypertension     Nasal septal deviation 08/2024    Nasal septal spur 08/2024    Nosebleed     Prediabetes     Snores      Past Surgical History:  Past Surgical History:   Procedure Laterality Date    ANKLE SURGERY Right      Social History:  reports that she quit smoking about 2 years ago. Her smoking use included cigarettes. She started smoking about 40 years ago. She has a 9.6 pack-year smoking history. She has never used smokeless tobacco. She reports current alcohol use of about 2.0 standard drinks of alcohol per week. She reports that she does not use drugs.    Family History: family history includes Breast cancer in her half-sister; Cancer in her father and mother; Colon cancer in her father; Diabetes in her mother; Hypertension in her mother; Lung cancer in her mother.      Allergies:  No Known Allergies  Medications:  Prior to Admission medications    Medication Sig Start Date End Date Taking? Authorizing Provider   amLODIPine (NORVASC) 10 MG tablet Take 1 tablet by mouth Daily. 8/13/24  Yes Rylee Lopez APRN azelastine  (ASTELIN) 0.1 % nasal spray 2 sprays into the nostril(s) as directed by provider 2 (Two) Times a Day. Use in each nostril as directed 8/13/24  Yes Rylee Lopez APRN   hydroCHLOROthiazide (MICROZIDE) 12.5 MG capsule Take 1 capsule by mouth Every Morning. 8/13/24  Yes Rylee Lopez APRN   losartan (COZAAR) 100 MG tablet Take 1 tablet by mouth Daily. 8/13/24  Yes Rylee Lopez APRN   pantoprazole (PROTONIX) 20 MG EC tablet Take 1 tablet by mouth Daily. 8/13/24  Yes Rylee Lopez APRN   rosuvastatin (CRESTOR) 10 MG tablet Take 1 tablet by mouth Every Night. 8/13/24  Yes Rylee Lopez APRN   tobramycin (Tobrex) 0.3 % solution ophthalmic solution Administer 2 drops to both eyes Every 4 (Four) Hours While Awake for 7 days. 8/13/24 8/20/24 Yes Rylee Lopez APRN       OSVALDO:        PHQ-9 Depression Screening  Little interest or pleasure in doing things?     Feeling down, depressed, or hopeless?     Trouble falling or staying asleep, or sleeping too much?     Feeling tired or having little energy?     Poor appetite or overeating?     Feeling bad about yourself - or that you are a failure or have let yourself or your family down?     Trouble concentrating on things, such as reading the newspaper or watching television?     Moving or speaking so slowly that other people could have noticed? Or the opposite - being so fidgety or restless that you have been moving around a lot more than usual?     Thoughts that you would be better off dead, or of hurting yourself in some way?     PHQ-9 Total Score     If you checked off any problems, how difficult have these problems made it for you to do your work, take care of things at home, or get along with other people?         PHQ-9 Total Score:           Objective     Vital Signs: /97 (BP Location: Left arm, Patient Position: Sitting, Cuff Size: Adult)   Pulse 91   Temp 98.2 °F (36.8 °C) (Infrared)   Resp 18   " Ht 165.1 cm (65\")   Wt 92.5 kg (204 lb)   LMP  (LMP Unknown)   SpO2 97%   BMI 33.95 kg/m²   Physical Exam  Vitals and nursing note reviewed.   Constitutional:       Appearance: She is obese.   HENT:      Head: Normocephalic.      Nose: Nose normal.      Mouth/Throat:      Mouth: Mucous membranes are moist.   Eyes:      Conjunctiva/sclera: Conjunctivae normal.      Left eye: Left conjunctiva is injected.      Pupils: Pupils are equal, round, and reactive to light.      Comments: Left periorbital edema.     Cardiovascular:      Rate and Rhythm: Normal rate and regular rhythm.      Pulses: Normal pulses.      Heart sounds: Normal heart sounds.   Pulmonary:      Effort: Pulmonary effort is normal.      Breath sounds: Normal breath sounds.   Musculoskeletal:         General: Normal range of motion.      Cervical back: Normal range of motion.   Skin:     General: Skin is warm and dry.   Neurological:      General: No focal deficit present.      Mental Status: She is alert and oriented to person, place, and time.   Psychiatric:         Mood and Affect: Mood normal.         Behavior: Behavior normal.         BMI is >= 30 and <35. (Class 1 Obesity). The following options were offered after discussion;: exercise counseling/recommendations and nutrition counseling/recommendations        Advance Care Planning            Results Reviewed:  Glucose   Date Value Ref Range Status   06/30/2023 112 (H) 65 - 99 mg/dL Final     BUN   Date Value Ref Range Status   06/30/2023 14 6 - 20 mg/dL Final     Creatinine   Date Value Ref Range Status   06/30/2023 0.75 0.57 - 1.00 mg/dL Final     Sodium   Date Value Ref Range Status   06/30/2023 142 136 - 145 mmol/L Final     Potassium   Date Value Ref Range Status   06/30/2023 4.4 3.5 - 5.2 mmol/L Final     Comment:     Slight hemolysis detected by analyzer. Results may be affected.     Chloride   Date Value Ref Range Status   06/30/2023 105 98 - 107 mmol/L Final     CO2   Date Value Ref " Range Status   06/30/2023 25.0 22.0 - 29.0 mmol/L Final     Calcium   Date Value Ref Range Status   06/30/2023 10.2 8.6 - 10.5 mg/dL Final     ALT (SGPT)   Date Value Ref Range Status   06/30/2023 24 1 - 33 U/L Final     AST (SGOT)   Date Value Ref Range Status   06/30/2023 22 1 - 32 U/L Final     WBC   Date Value Ref Range Status   06/30/2023 5.47 3.40 - 10.80 10*3/mm3 Final   01/09/2023 6.2 3.4 - 10.8 x10E3/uL Final     Hematocrit   Date Value Ref Range Status   06/30/2023 40.6 34.0 - 46.6 % Final     Platelets   Date Value Ref Range Status   06/30/2023 211 140 - 450 10*3/mm3 Final     Triglycerides   Date Value Ref Range Status   01/09/2023 134 0 - 149 mg/dL Final     HDL Cholesterol   Date Value Ref Range Status   01/09/2023 67 >39 mg/dL Final     LDL Chol Calc (NIH)   Date Value Ref Range Status   01/09/2023 88 0 - 99 mg/dL Final     Hemoglobin A1C   Date Value Ref Range Status   01/09/2023 6.0 (H) 4.8 - 5.6 % Final     Comment:              Prediabetes: 5.7 - 6.4           Diabetes: >6.4           Glycemic control for adults with diabetes: <7.0           Assessment / Plan     Assessment/Plan     Diagnoses and all orders for this visit:    1. Periorbital cellulitis of left eye (Primary)  -     amoxicillin-clavulanate (AUGMENTIN) 875-125 MG per tablet; Take 1 tablet by mouth 2 (Two) Times a Day for 7 days.  Dispense: 14 tablet; Refill: 0    2. Class 1 obesity due to excess calories without serious comorbidity with body mass index (BMI) of 33.0 to 33.9 in adult    Contacted patient's optometrist and they are able to see patient immediately.  Patient notified and will go to their office immediately for further evaluation of possible corneal abrasion and infection.             An After Visit Summary was printed and given to the patient at discharge.  Return if symptoms worsen or fail to improve.    I have discussed the patient results/orders and plan/recommendation with them at today's visit.      Rylee Lopez,  APRN   08/16/2024

## 2024-09-17 ENCOUNTER — OFFICE VISIT (OUTPATIENT)
Dept: FAMILY MEDICINE CLINIC | Facility: CLINIC | Age: 59
End: 2024-09-17
Payer: COMMERCIAL

## 2024-09-17 VITALS
SYSTOLIC BLOOD PRESSURE: 164 MMHG | HEART RATE: 83 BPM | RESPIRATION RATE: 18 BRPM | TEMPERATURE: 98.9 F | OXYGEN SATURATION: 97 % | DIASTOLIC BLOOD PRESSURE: 102 MMHG | WEIGHT: 196 LBS | BODY MASS INDEX: 31.5 KG/M2 | HEIGHT: 66 IN

## 2024-09-17 DIAGNOSIS — E66.09 CLASS 1 OBESITY DUE TO EXCESS CALORIES WITHOUT SERIOUS COMORBIDITY WITH BODY MASS INDEX (BMI) OF 32.0 TO 32.9 IN ADULT: Chronic | ICD-10-CM

## 2024-09-17 DIAGNOSIS — E66.811 CLASS 1 OBESITY DUE TO EXCESS CALORIES WITHOUT SERIOUS COMORBIDITY WITH BODY MASS INDEX (BMI) OF 32.0 TO 32.9 IN ADULT: Chronic | ICD-10-CM

## 2024-09-17 DIAGNOSIS — I10 PRIMARY HYPERTENSION: ICD-10-CM

## 2024-09-17 DIAGNOSIS — Z00.00 ANNUAL PHYSICAL EXAM: Primary | ICD-10-CM

## 2024-09-17 PROCEDURE — 99396 PREV VISIT EST AGE 40-64: CPT | Performed by: NURSE PRACTITIONER

## 2024-09-17 NOTE — PROGRESS NOTES
Subjective     Chief Complaint   Patient presents with    Annual Exam       History of Present Illness    Patient presents today for her annual physical.  She is not fasting.  She did take her blood pressure medication last night.  She is not checking her blood pressure at home the last several weeks.  She has increased stress with a job promotion at work and with changes in her home life.  With her job change she will be moving to [place], [place] in about 3 weeks.  Blood pressure was 140/80 in the morning when she was checking it at home and she takes her medications at night.  She is needing her medications refilled.      Patient's PMR from outside medical facility reviewed and noted.    Review of Systems     Otherwise complete ROS reviewed and negative except as mentioned in the HPI.    Past Medical History:   Past Medical History:   Diagnosis Date    Allergic rhinitis     All my life    Asthma     All my life    Chronic rhinosinusitis 08/2024    Dental disease     Dizziness     GERD (gastroesophageal reflux disease)     Hyperlipidemia     Hypertension     Nasal septal deviation 08/2024    Nasal septal spur 08/2024    Nosebleed     Prediabetes     Snores      Past Surgical History:  Past Surgical History:   Procedure Laterality Date    ANKLE SURGERY Right      Social History:  reports that she quit smoking about 2 years ago. Her smoking use included cigarettes. She started smoking about 40 years ago. She has a 9.6 pack-year smoking history. She has never used smokeless tobacco. She reports current alcohol use of about 2.0 standard drinks of alcohol per week. She reports that she does not use drugs.    Family History: family history includes Breast cancer in her half-sister; Cancer in her father and mother; Colon cancer in her father; Diabetes in her mother; Hypertension in her mother; Lung cancer in her mother.      Allergies:  No Known Allergies  Medications:  Prior to Admission medications    Medication  Sig Start Date End Date Taking? Authorizing Provider   amLODIPine (NORVASC) 10 MG tablet Take 1 tablet by mouth Daily. 8/13/24  Yes Rylee Lopez APRN   azelastine (ASTELIN) 0.1 % nasal spray 2 sprays into the nostril(s) as directed by provider 2 (Two) Times a Day. Use in each nostril as directed 8/13/24  Yes Rylee Lopez APRN   hydroCHLOROthiazide (MICROZIDE) 12.5 MG capsule Take 1 capsule by mouth Every Morning. 8/13/24  Yes Rylee Lopez APRN   losartan (COZAAR) 100 MG tablet Take 1 tablet by mouth Daily. 8/13/24  Yes Rylee Lopez APRN   pantoprazole (PROTONIX) 20 MG EC tablet Take 1 tablet by mouth Daily. 8/13/24  Yes Rylee Lopez APRN   rosuvastatin (CRESTOR) 10 MG tablet Take 1 tablet by mouth Every Night. 8/13/24  Yes Rylee Lopez APRN       OSVALDO:        PHQ-9 Depression Screening  Little interest or pleasure in doing things? 0-->not at all   Feeling down, depressed, or hopeless? 0-->not at all   Trouble falling or staying asleep, or sleeping too much?     Feeling tired or having little energy?     Poor appetite or overeating?     Feeling bad about yourself - or that you are a failure or have let yourself or your family down?     Trouble concentrating on things, such as reading the newspaper or watching television?     Moving or speaking so slowly that other people could have noticed? Or the opposite - being so fidgety or restless that you have been moving around a lot more than usual?     Thoughts that you would be better off dead, or of hurting yourself in some way?     PHQ-9 Total Score 0   If you checked off any problems, how difficult have these problems made it for you to do your work, take care of things at home, or get along with other people?         PHQ-9 Total Score: 0   0 (Negative screening for depression)  Support given, observe for worsening symptoms    Objective     Vital Signs: BP (!) 164/102 (BP Location: Left arm,  "Patient Position: Sitting, Cuff Size: Adult)   Pulse 83   Temp 98.9 °F (37.2 °C) (Infrared)   Resp 18   Ht 166.4 cm (65.5\")   Wt 88.9 kg (196 lb)   LMP  (LMP Unknown)   SpO2 97%   BMI 32.12 kg/m²   Physical Exam  Vitals and nursing note reviewed.   Constitutional:       Appearance: She is obese.   HENT:      Head: Normocephalic.      Right Ear: Tympanic membrane normal.      Left Ear: Tympanic membrane normal.      Nose: Nose normal.      Mouth/Throat:      Mouth: Mucous membranes are moist.   Eyes:      Conjunctiva/sclera: Conjunctivae normal.   Cardiovascular:      Rate and Rhythm: Normal rate and regular rhythm.      Pulses: Normal pulses.      Heart sounds: Normal heart sounds.   Pulmonary:      Effort: Pulmonary effort is normal.      Breath sounds: Normal breath sounds.   Abdominal:      General: Bowel sounds are normal.      Palpations: Abdomen is soft.   Musculoskeletal:         General: Normal range of motion.      Cervical back: Normal range of motion.   Skin:     General: Skin is warm and dry.   Neurological:      General: No focal deficit present.      Mental Status: She is alert and oriented to person, place, and time.   Psychiatric:         Mood and Affect: Mood normal.         Behavior: Behavior normal.         Advance Care Planning            Results Reviewed:  Glucose   Date Value Ref Range Status   06/30/2023 112 (H) 65 - 99 mg/dL Final     BUN   Date Value Ref Range Status   06/30/2023 14 6 - 20 mg/dL Final     Creatinine   Date Value Ref Range Status   06/30/2023 0.75 0.57 - 1.00 mg/dL Final     Sodium   Date Value Ref Range Status   06/30/2023 142 136 - 145 mmol/L Final     Potassium   Date Value Ref Range Status   06/30/2023 4.4 3.5 - 5.2 mmol/L Final     Comment:     Slight hemolysis detected by analyzer. Results may be affected.     Chloride   Date Value Ref Range Status   06/30/2023 105 98 - 107 mmol/L Final     CO2   Date Value Ref Range Status   06/30/2023 25.0 22.0 - 29.0 mmol/L " Final     Calcium   Date Value Ref Range Status   06/30/2023 10.2 8.6 - 10.5 mg/dL Final     ALT (SGPT)   Date Value Ref Range Status   06/30/2023 24 1 - 33 U/L Final     AST (SGOT)   Date Value Ref Range Status   06/30/2023 22 1 - 32 U/L Final     WBC   Date Value Ref Range Status   06/30/2023 5.47 3.40 - 10.80 10*3/mm3 Final   01/09/2023 6.2 3.4 - 10.8 x10E3/uL Final     Hematocrit   Date Value Ref Range Status   06/30/2023 40.6 34.0 - 46.6 % Final     Platelets   Date Value Ref Range Status   06/30/2023 211 140 - 450 10*3/mm3 Final     Triglycerides   Date Value Ref Range Status   01/09/2023 134 0 - 149 mg/dL Final     HDL Cholesterol   Date Value Ref Range Status   01/09/2023 67 >39 mg/dL Final     LDL Chol Calc (NIH)   Date Value Ref Range Status   01/09/2023 88 0 - 99 mg/dL Final     Hemoglobin A1C   Date Value Ref Range Status   01/09/2023 6.0 (H) 4.8 - 5.6 % Final     Comment:              Prediabetes: 5.7 - 6.4           Diabetes: >6.4           Glycemic control for adults with diabetes: <7.0           Assessment / Plan     Assessment/Plan     Diagnoses and all orders for this visit:    1. Annual physical exam (Primary)  -     CBC (No Diff)  -     Comprehensive Metabolic Panel  -     Lipid Panel  -     TSH    2. Class 1 obesity due to excess calories without serious comorbidity with body mass index (BMI) of 32.0 to 32.9 in adult    3. Primary hypertension  Assessment & Plan:  Hypertension is uncontrolled  Medication changes per orders.  Weight loss.  Ambulatory blood pressure monitoring.  Increase HCTZ to 25 mg daily.  Patient to take medications in the morning and check BP 1 hour after taking medications.  Blood pressure will be reassessedin 2 weeks.      Health Maintenance Counseling:  --Nutrition: Stressed importance of moderation in sodium/caffeine intake, saturated fat and cholesterol, caloric balance, sufficient intake of fresh fruits, vegetables, fiber, calcium and vit D  --Exercise: Recommended 30  minutes of exercise daily.  --Immunizations discussed and encouraged.             An After Visit Summary was printed and given to the patient at discharge.  Return in about 2 weeks (around 10/1/2024) for Follow up hypertension .    I have discussed the patient results/orders and plan/recommendation with them at today's visit.      Rylee Lopez, CHELSEA   09/17/2024

## 2024-09-17 NOTE — ASSESSMENT & PLAN NOTE
Hypertension is uncontrolled  Medication changes per orders.  Weight loss.  Ambulatory blood pressure monitoring.  Increase HCTZ to 25 mg daily.  Patient to take medications in the morning and check BP 1 hour after taking medications.  Blood pressure will be reassessedin 2 weeks.

## 2024-09-18 LAB
ALBUMIN SERPL-MCNC: 4.6 G/DL (ref 3.8–4.9)
ALP SERPL-CCNC: 85 IU/L (ref 44–121)
ALT SERPL-CCNC: 19 IU/L (ref 0–32)
AST SERPL-CCNC: 20 IU/L (ref 0–40)
BILIRUB SERPL-MCNC: 0.3 MG/DL (ref 0–1.2)
BUN SERPL-MCNC: 14 MG/DL (ref 6–24)
BUN/CREAT SERPL: 12 (ref 9–23)
CALCIUM SERPL-MCNC: 9.9 MG/DL (ref 8.7–10.2)
CHLORIDE SERPL-SCNC: 107 MMOL/L (ref 96–106)
CHOLEST SERPL-MCNC: 231 MG/DL (ref 100–199)
CO2 SERPL-SCNC: 22 MMOL/L (ref 20–29)
CREAT SERPL-MCNC: 1.13 MG/DL (ref 0.57–1)
EGFRCR SERPLBLD CKD-EPI 2021: 57 ML/MIN/1.73
ERYTHROCYTE [DISTWIDTH] IN BLOOD BY AUTOMATED COUNT: 12.3 % (ref 11.7–15.4)
GLOBULIN SER CALC-MCNC: 2.2 G/DL (ref 1.5–4.5)
GLUCOSE SERPL-MCNC: 127 MG/DL (ref 70–99)
HCT VFR BLD AUTO: 39.8 % (ref 34–46.6)
HDLC SERPL-MCNC: 57 MG/DL
HGB BLD-MCNC: 13.4 G/DL (ref 11.1–15.9)
LDLC SERPL CALC-MCNC: 115 MG/DL (ref 0–99)
MCH RBC QN AUTO: 35.3 PG (ref 26.6–33)
MCHC RBC AUTO-ENTMCNC: 33.7 G/DL (ref 31.5–35.7)
MCV RBC AUTO: 105 FL (ref 79–97)
PLATELET # BLD AUTO: 231 X10E3/UL (ref 150–450)
POTASSIUM SERPL-SCNC: 4.6 MMOL/L (ref 3.5–5.2)
PROT SERPL-MCNC: 6.8 G/DL (ref 6–8.5)
RBC # BLD AUTO: 3.8 X10E6/UL (ref 3.77–5.28)
SODIUM SERPL-SCNC: 143 MMOL/L (ref 134–144)
TRIGL SERPL-MCNC: 342 MG/DL (ref 0–149)
TSH SERPL DL<=0.005 MIU/L-ACNC: 0.95 UIU/ML (ref 0.45–4.5)
VLDLC SERPL CALC-MCNC: 59 MG/DL (ref 5–40)
WBC # BLD AUTO: 4.9 X10E3/UL (ref 3.4–10.8)

## 2024-09-19 DIAGNOSIS — I10 PRIMARY HYPERTENSION: Chronic | ICD-10-CM

## 2024-09-19 DIAGNOSIS — K21.9 GASTROESOPHAGEAL REFLUX DISEASE, UNSPECIFIED WHETHER ESOPHAGITIS PRESENT: Chronic | ICD-10-CM

## 2024-09-19 DIAGNOSIS — E78.01 FAMILIAL HYPERCHOLESTEROLEMIA: Primary | Chronic | ICD-10-CM

## 2024-09-19 DIAGNOSIS — J32.9 CHRONIC RHINOSINUSITIS: ICD-10-CM

## 2024-09-19 RX ORDER — AZELASTINE 1 MG/ML
2 SPRAY, METERED NASAL 2 TIMES DAILY
Qty: 30 ML | Refills: 12 | Status: SHIPPED | OUTPATIENT
Start: 2024-09-19

## 2024-09-19 RX ORDER — ROSUVASTATIN CALCIUM 20 MG/1
20 TABLET, COATED ORAL DAILY
Qty: 90 TABLET | Refills: 1 | Status: SHIPPED | OUTPATIENT
Start: 2024-09-19

## 2024-09-19 RX ORDER — LOSARTAN POTASSIUM 100 MG/1
100 TABLET ORAL DAILY
Qty: 90 TABLET | Refills: 1 | Status: SHIPPED | OUTPATIENT
Start: 2024-09-19

## 2024-09-19 RX ORDER — PANTOPRAZOLE SODIUM 20 MG/1
20 TABLET, DELAYED RELEASE ORAL DAILY
Qty: 90 TABLET | Refills: 1 | Status: SHIPPED | OUTPATIENT
Start: 2024-09-19

## 2024-09-19 RX ORDER — AMLODIPINE BESYLATE 10 MG/1
10 TABLET ORAL DAILY
Qty: 90 TABLET | Refills: 1 | Status: SHIPPED | OUTPATIENT
Start: 2024-09-19

## 2024-09-19 RX ORDER — HYDROCHLOROTHIAZIDE 12.5 MG/1
12.5 CAPSULE ORAL EVERY MORNING
Qty: 90 CAPSULE | Refills: 1 | Status: SHIPPED | OUTPATIENT
Start: 2024-09-19

## 2024-09-20 LAB
FOLATE SERPL-MCNC: 4 NG/ML
HBA1C MFR BLD: 5.8 % (ref 4.8–5.6)
VIT B12 SERPL-MCNC: 676 PG/ML (ref 232–1245)
WRITTEN AUTHORIZATION: NORMAL

## 2025-06-19 LAB
NCCN CRITERIA FLAG: ABNORMAL
TYRER CUZICK SCORE: 13.2